# Patient Record
Sex: FEMALE | Race: WHITE | NOT HISPANIC OR LATINO | Employment: OTHER | URBAN - NONMETROPOLITAN AREA
[De-identification: names, ages, dates, MRNs, and addresses within clinical notes are randomized per-mention and may not be internally consistent; named-entity substitution may affect disease eponyms.]

---

## 2019-10-29 ENCOUNTER — CLINICAL SUPPORT (OUTPATIENT)
Dept: CARDIOLOGY | Facility: CLINIC | Age: 59
End: 2019-10-29

## 2019-10-29 ENCOUNTER — TELEPHONE (OUTPATIENT)
Dept: CARDIOLOGY | Facility: CLINIC | Age: 59
End: 2019-10-29

## 2019-10-29 ENCOUNTER — OFFICE VISIT (OUTPATIENT)
Dept: CARDIOLOGY | Facility: CLINIC | Age: 59
End: 2019-10-29

## 2019-10-29 VITALS
HEIGHT: 65 IN | SYSTOLIC BLOOD PRESSURE: 110 MMHG | DIASTOLIC BLOOD PRESSURE: 70 MMHG | HEART RATE: 70 BPM | BODY MASS INDEX: 23.32 KG/M2 | WEIGHT: 140 LBS

## 2019-10-29 DIAGNOSIS — E78.00 HYPERCHOLESTEREMIA: ICD-10-CM

## 2019-10-29 DIAGNOSIS — R00.2 PALPITATIONS: Primary | ICD-10-CM

## 2019-10-29 DIAGNOSIS — R01.1 MURMUR, CARDIAC: ICD-10-CM

## 2019-10-29 DIAGNOSIS — R00.2 PALPITATIONS: ICD-10-CM

## 2019-10-29 DIAGNOSIS — R42 DIZZINESS: ICD-10-CM

## 2019-10-29 DIAGNOSIS — F41.9 ANXIETY: ICD-10-CM

## 2019-10-29 PROCEDURE — 99203 OFFICE O/P NEW LOW 30 MIN: CPT | Performed by: INTERNAL MEDICINE

## 2019-10-29 PROCEDURE — 93000 ELECTROCARDIOGRAM COMPLETE: CPT | Performed by: INTERNAL MEDICINE

## 2019-10-29 PROCEDURE — 0296T PR EXT ECG > 48HR TO 21 DAY RCRD W/CONECT INTL RCRD: CPT | Performed by: INTERNAL MEDICINE

## 2019-10-29 RX ORDER — ALPRAZOLAM 0.5 MG/1
0.5 TABLET ORAL DAILY
COMMUNITY
End: 2021-08-17 | Stop reason: ALTCHOICE

## 2019-10-29 NOTE — TELEPHONE ENCOUNTER
10 day extended monitor placed today.  Patient asking when you want her to start the metoprolol?  She said she was unaware you were starting a medicine.

## 2019-10-29 NOTE — PROGRESS NOTES
Chief Complaint   Patient presents with   • Establish Care     palpitations, history of MVP   • Palpitations     started about 8 months ago, worse with anxiety. Occuring 2-3 times a week. No other symptoms associated. Usually occurs when laying down, lasts a few minutes.    • Cardiac history     diagnosed with MVP 29 years ago, saw cardiologist in [place] at the time, only an EKG since then.    • Aspirin     does not take a daily aspirin   • Labs     PCP recently checked, will request results.   • Anxiety     was previously on Prozac for several years, became ineffective. PCP trying to find one to replace it. Currently not taking anything, to start a new one in a week.         CARDIAC COMPLAINTS  Dizziness and palpitations      Subjective   Mariaa Abraham is a 59 y.o. female came in today for her initial cardiac evaluation.  She has history of mitral valve prolapse diagnosed many years ago when she was seen by a cardiologist at [place].  She was never put on any medications and has not had any symptoms from that.  She also has history of anxiety for which she has been on multiple different medications in the past.  She is now trying to wean her off most of the medication and just uses Xanax.  She also is taking Prozac and apparently the psychiatrist want to change into the Lexapro.  About 6 months ago she started noticing palpitation in the form of heart racing and skipping which occurs worse with anxiety but also at rest.  It normally lasts for few seconds to few minutes.  It is occasionally lasted for an hour.  During that time she feels dizzy, lightheaded.  She has not had any recent syncopal episode.  She drinks about a cup of coffee a day and very rarely she drinks any pop.  She did undergo some lab work recently and found to have normal electrolytes, normal B12 and folic acid level, normal hemoglobin, normal TSH.  Her cholesterol though was elevated at 308 with the LDL of 192 and HDL of 88.  She used to be  a smoker who quit many years ago.  Hypercholesterolemia runs in the family.  Her mother did have diabetes and did have heart disease.    History reviewed. No pertinent surgical history.    Current Outpatient Medications   Medication Sig Dispense Refill   • ALPRAZolam (XANAX) 0.5 MG tablet Take 0.5 mg by mouth Daily.     • metoprolol tartrate (LOPRESSOR) 25 MG tablet Take 1 tablet by mouth 2 (Two) Times a Day. 60 tablet 11     No current facility-administered medications for this visit.            ALLERGIES:  Bactrim [sulfamethoxazole-trimethoprim] and Sulfa antibiotics    Past Medical History:   Diagnosis Date   • Heart murmur    • History of bilateral breast reduction surgery    • History of surgery on right wrist    • Hyperlipidemia    • Mitral valve prolapse        Social History     Tobacco Use   Smoking Status Former Smoker   • Years: 5.00   • Types: Cigarettes   • Last attempt to quit:    • Years since quittin.8   Smokeless Tobacco Never Used          Family History   Problem Relation Age of Onset   • Heart attack Mother          at 69 with MI   • Heart disease Mother         CABG at 62, Valve replaced at 69 after her MI   • Hypertension Mother    • Hyperlipidemia Mother    • Diabetes Mother    • Alzheimer's disease Father    • Hyperlipidemia Brother    • Heart attack Maternal Grandfather          with MI at 62       Review of Systems   Constitution: Positive for malaise/fatigue. Negative for decreased appetite.   HENT: Negative for congestion and sore throat.    Eyes: Negative for blurred vision.   Cardiovascular: Positive for dyspnea on exertion and palpitations. Negative for chest pain.   Respiratory: Positive for shortness of breath. Negative for snoring.    Endocrine: Negative for cold intolerance and heat intolerance.   Hematologic/Lymphatic: Negative for adenopathy. Does not bruise/bleed easily.   Skin: Negative for itching, nail changes and skin cancer.   Musculoskeletal: Negative for  "arthritis and myalgias.   Gastrointestinal: Negative for abdominal pain, dysphagia and heartburn.   Genitourinary: Negative for bladder incontinence and frequency.   Neurological: Positive for dizziness. Negative for light-headedness, seizures and vertigo.   Psychiatric/Behavioral: Negative for altered mental status. The patient is nervous/anxious.    Allergic/Immunologic: Negative for environmental allergies and hives.       Diabetes- No  Thyroid- normal    Objective     /70 (BP Location: Left arm)   Pulse 70   Ht 165.1 cm (65\")   Wt 63.5 kg (140 lb)   BMI 23.30 kg/m²     Physical Exam   Constitutional: She is oriented to person, place, and time. She appears well-developed and well-nourished.   HENT:   Head: Normocephalic.   Nose: Nose normal.   Eyes: EOM are normal. Pupils are equal, round, and reactive to light.   Neck: Normal range of motion. Neck supple.   Cardiovascular: Normal rate, regular rhythm, S1 normal and S2 normal.   Murmur heard.  Pulmonary/Chest: Effort normal and breath sounds normal.   Abdominal: Soft. Bowel sounds are normal.   Musculoskeletal: Normal range of motion. She exhibits no edema.   Neurological: She is alert and oriented to person, place, and time.   Skin: Skin is warm and dry.   Psychiatric: She has a normal mood and affect.         ECG 12 Lead  Date/Time: 10/29/2019 3:37 PM  Performed by: Melissa Ricci MD  Authorized by: Melissa Ricci MD   Previous ECG: no previous ECG available  Rhythm: sinus rhythm  Rate: normal  QRS axis: normal  Other findings: non-specific ST-T wave changes    Clinical impression: non-specific ECG              Assessment/Plan   Patient's Body mass index is 23.3 kg/m². BMI is within normal parameters. No follow-up required..     Mariaa was seen today for establish care, palpitations, cardiac history, aspirin, labs and anxiety.    Diagnoses and all orders for this visit:    Palpitations  -     Adult Transthoracic Echo Complete W/ Cont if " Necessary Per Protocol; Future  -     Holter Monitor - 72 Hour Up To 21 Days; Future  -     metoprolol tartrate (LOPRESSOR) 25 MG tablet; Take 1 tablet by mouth 2 (Two) Times a Day.    Anxiety  -     metoprolol tartrate (LOPRESSOR) 25 MG tablet; Take 1 tablet by mouth 2 (Two) Times a Day.    Dizziness    Hypercholesteremia    Murmur, cardiac  -     Adult Transthoracic Echo Complete W/ Cont if Necessary Per Protocol; Future    At baseline, her heart rate is stable and so is her blood pressure.  EKG shows normal sinus rhythm, normal NV interval and normal QTC with nonspecific ST-T changes.  Clinical examination reveals her BMI is 23.  Cardiovascular examination systolic murmur.  At this time am not able to appreciate any click.  He does have normal peripheral pulse.    Regarding the palpitation, it appears to be more of anxiety related and it could be sinus tachycardia itself.  Other possibility could be PSVT.  I started her on a low-dose of beta-blockers in the form of Lopressor 25 mg twice daily might help with the anxiety also along with the palpitation.  I also placed a Holter monitor to evaluate the kind of arrhythmia she has.  If she has multiple runs of PSVT or PAC's then became change to class Ic antiarrhythmic medication if beta-blockers is not.  Also advised her to get an echocardiogram to evaluate the LV function, valvular structures as well as the PA pressure..    Regarding the dizziness,, it could be related to tachycardia itself.  I advised her to increase her fluid intake and avoid any caffeine intake.    Regarding the hypercholesterolemia, it is quite significantly elevated.  I talked to her about cutting down on the fat intake especially animal fat.  Recheck the lipids in about 6 months and if it is still significantly elevated, she may need to be on a statin.    The mitral valve prolapse, at this time not able to hear any click so we will get the echocardiogram to evaluate the valvular  structure.    Based on the results of all these tests, further recommendations will be made.               Electronically signed by Melissa Ricci MD October 29, 2019 12:36 PM

## 2019-10-30 NOTE — TELEPHONE ENCOUNTER
Patient aware of recommendations to start the metoprolol in 2 days of wearing the extended monitor to see the effect of the metoprolol.

## 2019-11-04 ENCOUNTER — HOSPITAL ENCOUNTER (OUTPATIENT)
Dept: CARDIOLOGY | Facility: HOSPITAL | Age: 59
Discharge: HOME | End: 2019-11-04
Admitting: INTERNAL MEDICINE

## 2019-11-04 VITALS — BODY MASS INDEX: 23.32 KG/M2 | HEIGHT: 65 IN | WEIGHT: 139.99 LBS

## 2019-11-04 DIAGNOSIS — R01.1 MURMUR, CARDIAC: ICD-10-CM

## 2019-11-04 DIAGNOSIS — R00.2 PALPITATIONS: ICD-10-CM

## 2019-11-04 LAB
BH CV ECHO MEAS - ACS: 1.8 CM
BH CV ECHO MEAS - AO MAX PG (FULL): 0.17 MMHG
BH CV ECHO MEAS - AO MAX PG: 3 MMHG
BH CV ECHO MEAS - AO MEAN PG (FULL): 0 MMHG
BH CV ECHO MEAS - AO MEAN PG: 1 MMHG
BH CV ECHO MEAS - AO ROOT AREA (BSA CORRECTED): 1.8
BH CV ECHO MEAS - AO ROOT AREA: 7.1 CM^2
BH CV ECHO MEAS - AO ROOT DIAM: 3 CM
BH CV ECHO MEAS - AO V2 MAX: 86.1 CM/SEC
BH CV ECHO MEAS - AO V2 MEAN: 54.5 CM/SEC
BH CV ECHO MEAS - AO V2 VTI: 16.1 CM
BH CV ECHO MEAS - BSA(HAYCOCK): 1.7 M^2
BH CV ECHO MEAS - BSA: 1.7 M^2
BH CV ECHO MEAS - BZI_BMI: 23.3 KILOGRAMS/M^2
BH CV ECHO MEAS - BZI_METRIC_HEIGHT: 165.1 CM
BH CV ECHO MEAS - BZI_METRIC_WEIGHT: 63.5 KG
BH CV ECHO MEAS - EDV(CUBED): 91.1 ML
BH CV ECHO MEAS - EDV(TEICH): 92.4 ML
BH CV ECHO MEAS - EF(CUBED): 75.1 %
BH CV ECHO MEAS - EF(TEICH): 67.2 %
BH CV ECHO MEAS - ESV(CUBED): 22.7 ML
BH CV ECHO MEAS - ESV(TEICH): 30.3 ML
BH CV ECHO MEAS - FS: 37.1 %
BH CV ECHO MEAS - IVS/LVPW: 0.83
BH CV ECHO MEAS - IVSD: 0.78 CM
BH CV ECHO MEAS - LA DIMENSION(2D): 3.3 CM
BH CV ECHO MEAS - LA DIMENSION: 3.4 CM
BH CV ECHO MEAS - LA/AO: 1.1
BH CV ECHO MEAS - LV IVRT: 0.1 SEC
BH CV ECHO MEAS - LV MASS(C)D: 126.2 GRAMS
BH CV ECHO MEAS - LV MASS(C)DI: 74.2 GRAMS/M^2
BH CV ECHO MEAS - LV MAX PG: 2.8 MMHG
BH CV ECHO MEAS - LV MEAN PG: 1 MMHG
BH CV ECHO MEAS - LV V1 MAX: 83.6 CM/SEC
BH CV ECHO MEAS - LV V1 MEAN: 49.1 CM/SEC
BH CV ECHO MEAS - LV V1 VTI: 19.2 CM
BH CV ECHO MEAS - LVIDD: 4.5 CM
BH CV ECHO MEAS - LVIDS: 2.8 CM
BH CV ECHO MEAS - LVPWD: 0.95 CM
BH CV ECHO MEAS - MV A MAX VEL: 49.7 CM/SEC
BH CV ECHO MEAS - MV DEC TIME: 0.24 SEC
BH CV ECHO MEAS - MV E MAX VEL: 80.8 CM/SEC
BH CV ECHO MEAS - MV E/A: 1.6
BH CV ECHO MEAS - MV MAX PG: 2.3 MMHG
BH CV ECHO MEAS - MV MEAN PG: 1 MMHG
BH CV ECHO MEAS - MV V2 MAX: 76 CM/SEC
BH CV ECHO MEAS - MV V2 MEAN: 42.7 CM/SEC
BH CV ECHO MEAS - MV V2 VTI: 22.9 CM
BH CV ECHO MEAS - PA MAX PG (FULL): 0.52 MMHG
BH CV ECHO MEAS - PA MAX PG: 2.6 MMHG
BH CV ECHO MEAS - PA MEAN PG (FULL): 1 MMHG
BH CV ECHO MEAS - PA MEAN PG: 2 MMHG
BH CV ECHO MEAS - PA V2 MAX: 80.3 CM/SEC
BH CV ECHO MEAS - PA V2 MEAN: 57.6 CM/SEC
BH CV ECHO MEAS - PA V2 VTI: 16.5 CM
BH CV ECHO MEAS - RV MAX PG: 2.1 MMHG
BH CV ECHO MEAS - RV MEAN PG: 1 MMHG
BH CV ECHO MEAS - RV V1 MAX: 71.8 CM/SEC
BH CV ECHO MEAS - RV V1 MEAN: 45.5 CM/SEC
BH CV ECHO MEAS - RV V1 VTI: 15.2 CM
BH CV ECHO MEAS - RVDD: 2.8 CM
BH CV ECHO MEAS - SI(AO): 66.9 ML/M^2
BH CV ECHO MEAS - SI(CUBED): 40.3 ML/M^2
BH CV ECHO MEAS - SI(TEICH): 36.5 ML/M^2
BH CV ECHO MEAS - SV(AO): 113.8 ML
BH CV ECHO MEAS - SV(CUBED): 68.5 ML
BH CV ECHO MEAS - SV(TEICH): 62.1 ML
MAXIMAL PREDICTED HEART RATE: 163 BPM
STRESS TARGET HR: 139 BPM

## 2019-11-04 PROCEDURE — 93306 TTE W/DOPPLER COMPLETE: CPT

## 2019-11-04 PROCEDURE — 93306 TTE W/DOPPLER COMPLETE: CPT | Performed by: INTERNAL MEDICINE

## 2019-11-21 ENCOUNTER — OUTSIDE FACILITY SERVICE (OUTPATIENT)
Dept: CARDIOLOGY | Facility: CLINIC | Age: 59
End: 2019-11-21

## 2019-11-21 DIAGNOSIS — R00.2 PALPITATIONS: ICD-10-CM

## 2019-11-21 PROCEDURE — 0298T PR EXT ECG > 48HR TO 21 DAY REVIEW AND INTERPRETATN: CPT | Performed by: INTERNAL MEDICINE

## 2019-12-03 ENCOUNTER — TELEPHONE (OUTPATIENT)
Dept: CARDIOLOGY | Facility: CLINIC | Age: 59
End: 2019-12-03

## 2019-12-03 RX ORDER — PROPRANOLOL HYDROCHLORIDE 10 MG/1
10 TABLET ORAL DAILY PRN
Qty: 30 TABLET | Refills: 11 | Status: SHIPPED | OUTPATIENT
Start: 2019-12-03 | End: 2021-11-22

## 2021-08-17 ENCOUNTER — DISEASE STATE MANAGEMENT VISIT (OUTPATIENT)
Dept: PHARMACY | Facility: HOSPITAL | Age: 61
End: 2021-08-17

## 2021-08-17 DIAGNOSIS — F33.2 SEVERE RECURRENT MAJOR DEPRESSION WITHOUT PSYCHOTIC FEATURES (HCC): Primary | ICD-10-CM

## 2021-08-17 RX ORDER — CLONIDINE HYDROCHLORIDE 0.1 MG/1
0.1 TABLET ORAL NIGHTLY PRN
COMMUNITY
End: 2023-03-29 | Stop reason: SDUPTHER

## 2021-08-17 RX ORDER — VENLAFAXINE HYDROCHLORIDE 37.5 MG/1
37.5 CAPSULE, EXTENDED RELEASE ORAL 2 TIMES DAILY WITH MEALS
COMMUNITY
End: 2021-10-29

## 2021-08-17 RX ORDER — MULTIPLE VITAMINS W/ MINERALS TAB 9MG-400MCG
1 TAB ORAL DAILY
COMMUNITY

## 2021-08-17 RX ORDER — MELATONIN
1000 DAILY
COMMUNITY

## 2021-08-17 RX ORDER — TRAZODONE HYDROCHLORIDE 50 MG/1
50 TABLET ORAL NIGHTLY PRN
COMMUNITY
End: 2023-03-29 | Stop reason: SDUPTHER

## 2021-08-17 RX ORDER — ROSUVASTATIN CALCIUM 10 MG/1
10 TABLET, COATED ORAL DAILY
COMMUNITY
End: 2022-11-09

## 2021-08-17 RX ORDER — LORAZEPAM 0.5 MG/1
0.5 TABLET ORAL 2 TIMES DAILY PRN
COMMUNITY

## 2021-08-17 NOTE — PROGRESS NOTES
Pharmacy Note    Mariaa Abraham is a 61 y.o. female [facility] for treatment resistant depression.       Medication: nasal esketamine (Spravato)    Dose: 56 mg   Provider: Rafael Chew        Past Medical History:   Diagnosis Date   • Heart murmur    • History of bilateral breast reduction surgery    • History of surgery on right wrist    • Hyperlipidemia    • Mitral valve prolapse      Social History     Socioeconomic History   • Marital status:      Spouse name: Not on file   • Number of children: Not on file   • Years of education: Not on file   • Highest education level: Not on file   Tobacco Use   • Smoking status: Former Smoker     Years: 5.00     Types: Cigarettes     Quit date:      Years since quittin.6   • Smokeless tobacco: Never Used   Substance and Sexual Activity   • Alcohol use: Yes     Alcohol/week: 7.0 standard drinks     Types: 7 Glasses of wine per week   • Drug use: No     Allergies   Allergen Reactions   • Bactrim [Sulfamethoxazole-Trimethoprim] Rash   • Sulfa Antibiotics Rash     Current Outpatient Medications   Medication Sig Dispense Refill   • ALPRAZolam (XANAX) 0.5 MG tablet Take 0.5 mg by mouth Daily.     • propranolol (INDERAL) 10 MG tablet Take 1 tablet by mouth Daily As Needed (for palpitations). 30 tablet 11     No current facility-administered medications for this visit.       Patient History    Aneurysmal vascular disease (including thoracic and abdominal aorta, intracranial, and peripheral arterial vessels)? No    Arteriovenous malformation No    History of intracerebral hemorrhage No    Significant cardiovascular history? No    Is the patient currently taking any of the following concomitant medication(s) that may cause sedation or blood pressure changes?   Spravato hold meds: Benzodiazepines      Does the Pt have severe hepatic impairment (Child-Cervantes class C)? No    Pregnant, breastfeeding, or planning to become pregnant? No  If  childbearing potential, on effective contraception? No    Antidepressant Treatment Failure Dates   Prozac     Paxil    Zoloft    Lamotrigine    Latuda    Fluvoxamine      SPRAVATO® REMS Patient Confirmation Number: pending    PHQ-9 Dates   Baseline: 21/27 8/17/2021                                       Drug Interactions Screening    A drug-drug interactions check was made. Patient has QTc prolongating agents (effexor and trazodone) on board patient's EKG in 2019 was WNL, will continue to monitor to see if patient becomes symptomatic. Also has medication that can cause CNS depression (lorazepam, trazodone, and Spravato) - patient will hold lorazepam per Dr. Chew and will be monitored for excessive sedation.     Assessment & Plan    Patient diagnosed with treatment resistant depression and has tried multiple antidepressants.  Will order Spravato™ 56mg intranasal once for administration in the [facility].  Pt will be monitored for a minimum of 2 hours according to protocol. Medication Guide will be provided at first visit.     Referring provider did indicate that the patient should hold any medication the morning prior to administration. (Lorazepam)     Will begin prior authorization process and provide medication counseling to patient once drug is approved and ready to dispense.      Will follow-up with patient prior to each administration.     aMcy Rodriguez, [place]  08/17/21  15:17 EDT             SPRAVATO® Patient Education   The patient was counseled on the following:   - This medication will only be administered in the facility and you must remain in the facility for monitoring for a minimum of 2 hours.  - To minimize chance of nausea, do not eat anything 2 hours before your appointment and do not drink anything 30 minutes before taking.   - If you take a nasal corticosteroid or nasal decongestant, take at least 1 hour prior to appointment  - If instructed by your referring provider, hold  medication(s) that may increase your BP or increase risk of sedation the morning prior to your appointment.    - This medication may cause you to feel “drunk”, sluggish, fatigue, or dizziness.  After administration, you cannot drive or operate heavy machinery until the next day after a restful sleep.  You should not do anything that you need to be completely alert for. You must have a pre-arranged  to take you home from each visit.    - May experience nausea/vomiting.   - Let your doctor know right away if you have any worsening of depression, signs of suicide, emotional instability or confusion.   - Call 911 or go to the nearest ED with any signs or symptoms of allergic reaction.   - Spravato can cause a temporary increase in your blood pressure that may last for about 4 hours after taking a dose. Your healthcare provider will check your blood pressure before taking SPRAVATO® and for at least 2 hours after you take SPRAVATO®. Tell your healthcare provider right away if you get chest pain, shortness of breath, sudden severe headache, change in vision, or seizures after taking SPRAVATO®.  - Tell your healthcare provider if you have problems thinking or remembering.  - Tell your healthcare provider if you develop trouble urinating, such as a frequent or urgent need to urinate, pain when urinating, or urinating frequently at night.  - The most common side effects of SPRAVATO® when used along with an antidepressant taken by mouth include: dissociation, dizziness, nausea, sedation, spinning sensation, reduced sense of touch and sensation, anxiety, lack of energy, increased blood pressure, vomiting, and feeling drunk.  - SPRAVATO® may cause sleepiness (sedation), fainting, dizziness, spinning sensation, anxiety, or feeling disconnected from yourself, your thoughts, feelings, space, and time (dissociation).  - Tell your healthcare provider right away if you feel like you cannot stay awake or if you feel like you are  going to pass out.  - Your healthcare provider must monitor you for serious side effects for at least 2 hours after taking SPRAVATO®. Your healthcare provider will decide when you are ready to leave the healthcare setting.  - There is a risk for abuse and physical and psychological dependence with SPRAVATO® treatment. Your healthcare provider should check you for signs of abuse and dependence before and during treatment with SPRAVATO®.  - Tell your healthcare provider if you have ever abused or been dependent on alcohol, prescription medicines, or street drugs.  Tell your healthcare provider right away if you have any of the following symptoms, especially if they are new, worse, or worry you: attempts to commit suicide, thoughts about suicide or dying, worsening depression, or other unusual changes in behavior or mood.

## 2021-08-24 ENCOUNTER — OFFICE VISIT (OUTPATIENT)
Dept: PSYCHIATRY | Facility: CLINIC | Age: 61
End: 2021-08-24

## 2021-08-24 ENCOUNTER — HOSPITAL ENCOUNTER (OUTPATIENT)
Dept: INFUSION THERAPY | Facility: HOSPITAL | Age: 61
Setting detail: INFUSION SERIES
Discharge: HOME | End: 2021-08-24

## 2021-08-24 ENCOUNTER — HOSPITAL ENCOUNTER (OUTPATIENT)
Dept: RESPIRATORY THERAPY | Facility: HOSPITAL | Age: 61
Discharge: HOME | End: 2021-08-24
Admitting: PSYCHIATRY & NEUROLOGY

## 2021-08-24 VITALS
HEART RATE: 71 BPM | DIASTOLIC BLOOD PRESSURE: 74 MMHG | RESPIRATION RATE: 17 BRPM | TEMPERATURE: 97.7 F | SYSTOLIC BLOOD PRESSURE: 124 MMHG

## 2021-08-24 DIAGNOSIS — R00.2 PALPITATIONS: ICD-10-CM

## 2021-08-24 DIAGNOSIS — R00.2 PALPITATIONS: Primary | ICD-10-CM

## 2021-08-24 DIAGNOSIS — F32.A DEPRESSION, UNSPECIFIED DEPRESSION TYPE: Primary | ICD-10-CM

## 2021-08-24 LAB
QT INTERVAL: 390 MS
QTC INTERVAL: 458 MS

## 2021-08-24 PROCEDURE — 93010 ELECTROCARDIOGRAM REPORT: CPT | Performed by: INTERNAL MEDICINE

## 2021-08-24 PROCEDURE — 25010000002 ESKETAMINE HCL (56 MG DOSE) 28 MG/DEVICE SOLUTION THERAPY: Performed by: PSYCHIATRY & NEUROLOGY

## 2021-08-24 PROCEDURE — 93005 ELECTROCARDIOGRAM TRACING: CPT | Performed by: PSYCHIATRY & NEUROLOGY

## 2021-08-24 PROCEDURE — S0013 ESKETAMINE, NASAL SPRAY: HCPCS | Performed by: PSYCHIATRY & NEUROLOGY

## 2021-08-24 PROCEDURE — 90792 PSYCH DIAG EVAL W/MED SRVCS: CPT | Performed by: PSYCHIATRY & NEUROLOGY

## 2021-08-24 PROCEDURE — G0463 HOSPITAL OUTPT CLINIC VISIT: HCPCS

## 2021-08-24 RX ADMIN — ESKETAMINE HYDROCHLORIDE 56 MG: 28 SOLUTION NASAL at 13:14

## 2021-08-24 NOTE — PATIENT INSTRUCTIONS
Esketamine nasal spray  What is this medicine?  Esketamine (Es EARL ta meen) is an antidepressant used in combination with other antidepressants for treating depression that has not responded to other therapies. This medicine is also used with other antidepressants in patients who have depression along with suicidal thoughts or actions. Esketamine is only given in a healthcare setting, such as a clinic or hospital.  This medicine may be used for other purposes; ask your health care provider or pharmacist if you have questions.  COMMON BRAND NAME(S): Spravato  What should I tell my health care provider before I take this medicine?  They need to know if you have any of these conditions:  · bleeding in the brain  · blood vessel disease (including in the brain, chest, arms, legs, or other areas of the body)  · dementia  · heart disease  · high blood pressure  · history of drug abuse or alcohol abuse problem  · history of head injury  · history of heart attack  · history of irregular heartbeat  · history of stroke  · liver disease  · schizophrenia or other thought disorder  · suicidal thoughts, plans or attempt; a previous suicide by you or a family member  · an unusual or allergic reaction to esketamine, ketamine, other medicines, foods, dyes, or preservatives  · pregnant or trying to get pregnant  · breast-feeding  How should I use this medicine?  This medicine is for use in the nose. You will take it yourself under the supervision of a healthcare provider in a healthcare setting. Your provider will show you how to use the nasal spray device, how much to take, and when to take it. During and after each treatment, you will be checked by a healthcare provider who will decide when you can go home. You will need to plan for a caregiver or family member to drive you home each time after taking the medicine. Do not drive or perform other tasks that require complete mental alertness until the day following your treatment after  a restful sleep. Because this medicine can cause nausea and vomiting, you should not eat for at least 2 hours before taking this medicine or drink liquids for at least 30 minutes before taking this medicine. If you take a nasal corticosteroid or nasal decongestant, take these medicines at least 1 hour before your treatment. Do not stop your treatment except on your doctor's advice.  A special MedGuide will be given to you by the pharmacist with each prescription. Be sure to read this information carefully each time.  Talk to your pediatrician regarding the use of this medicine in children. This medicine is not approved for use in children.  Overdosage: If you think you have taken too much of this medicine contact a poison control center or emergency room at once.  NOTE: This medicine is only for you. Do not share this medicine with others.  What if I miss a dose?  Keep appointments for follow-up doses. It is important not to miss your dose. Call your doctor or healthcare professional if you are unable to keep an appointment.  What may interact with this medicine?  This medicine may interact with the following medications:  · alcohol  · antihistamines for allergy, cough, and cold  · certain medicines for anxiety or sleep  · certain medicines for depression like amitriptyline, fluoxetine, sertraline  · certain medicines for seizures like phenobarbital, primidone  · general anesthetics like halothane, isoflurane, methoxyflurane, propofol  · ketamine  · local anesthetics like lidocaine, pramoxine, tetracaine  · MAOIs like Carbex, Eldepryl, Marplan, Nardil, and Parnate  · medicines that relax muscles for surgery  · narcotic medicines for pain  · phenothiazines like chlorpromazine, mesoridazine, prochlorperazine, thioridazine  · psychostimulants like amphetamines, methylphenidate, dexmethylphenidate, modafinil, or armodafinil  This list may not describe all possible interactions. Give your health care provider a list of  all the medicines, herbs, non-prescription drugs, or dietary supplements you use. Also tell them if you smoke, drink alcohol, or use illegal drugs. Some items may interact with your medicine.  What should I watch for while using this medicine?  Your condition will be monitored carefully while you are receiving this medicine. Tell your healthcare professional if your symptoms do not start to get better or if they get worse.  You may get drowsy or dizzy. Do not drive, use machinery, or do anything that needs mental alertness until the day following your treatment after a restful sleep. Do not stand up or sit up quickly, especially if you are an older patient. This reduces the risk of dizzy or fainting spells. Avoid alcoholic drinks; they can make you dizzier.  There is a risk for abuse and dependence when using this medicine. Your healthcare provider should check you for signs of abuse and dependence before and during treatment with this medicine. Your healthcare provider can tell you more about the difference between dependence and drug addiction.  Your healthcare provider must monitor you for serious side effects for at least 2 hours after each treatment with this medicine. Your healthcare provider will decide when you are ready to leave the healthcare setting and return home. Tell your healthcare provider right away if you feel like you cannot stay awake or if you feel like you are going to pass out.  Women should inform their doctor if they wish to become pregnant or think they might be pregnant. There is a potential for serious side effects to an unborn child. Women should not breast-feed their infant while taking this medicine. This medicine passes into breast milk and may cause harm to a nursing infant.  What side effects may I notice from receiving this medicine?  Side effects that you should report to your doctor or health care professional as soon as possible:  · allergic reactions like skin rash, itching or  hives; swelling of the face, lips, or tongue  · altered sense of time and space  · confusion  · disconnected from yourself, your thoughts, or your feelings  · fast, irregular heartbeat  · hallucinations  · loss of balance or coordination  · loss of consciousness  · loss of memory  · reduced sense of touch or sensation  · signs and symptoms of bladder or urinary tract problems like frequent or urgent urination; frequently urinating at night; painful urinating; blood in urine  · signs and symptoms of a dangerous increase in blood pressure like chest pain; shortness of breath; sudden severe headache; vision disturbances; seizures; decreased consciousness  · signs and symptoms of a stroke like changes in vision; confusion; trouble speaking or understanding; severe headaches; sudden numbness or weakness of the face, arm or leg; trouble walking; dizziness; loss of balance or coordination  · suicidal thoughts, mood changes  Side effects that usually do not require medical attention (report these to your doctor or health care professional if they continue or are bothersome):  · dizziness  · drowsiness  · dry mouth  · feeling drunk  · feeling very happy or excited  · lack of energy  · nausea  · spinning sensation  · vomiting  This list may not describe all possible side effects. Call your doctor for medical advice about side effects. You may report side effects to FDA at 6-044-MHF-7503.  Where should I keep my medicine?  This medicine is given in a hospital or clinic and will not be stored at home.  NOTE: This sheet is a summary. It may not cover all possible information. If you have questions about this medicine, talk to your doctor, pharmacist, or health care provider.  © 2021 [employer] (2021-01-07 14:42:12)

## 2021-08-24 NOTE — PROGRESS NOTES
"      INITIAL PSYCHIATRIC HISTORY & PHYSICAL    Patient Identification:  Name:  Mariaa Abraham  Age:  61 y.o.  Sex:  female  :  1960  MRN:  [id number]   Visit Number:  [id number]  Primary Care Physician:  Anitha Martin APRN    SUBJECTIVE    CC/Focus of Exam: depression, Spravato evaluatiion    HPI: Mariaa Abraham \"Lyla\" is a 61 y.o. female who presents today for evaluation to consider Spravato for treatment-resistant depression.     Lyla has struggled with depression for many years. She was called a \"worry wart\" as a child. At 30y, she had her first major depressive episode for which she sought counseling and started medication. She was always diagnosed with OCD & MAHAMED at that time. She was started on fluoxetine, which helped significantly and provided some stability for roughly 25 years. Roughly two years ago, Lyla had another depressive episode. She started care at [facility] & was taken off fluoxetine, started fluvoxamine. Following a two-week med holiday, she started escitalopram but anxiety worsened. Thereafter, she was prescribed sertraline, Latuda, lamotrigine & paroxetine. Symptoms of depression & anxiety worsened to the degree that patient was been unable to get out of bed, with worsening mood, hopelessness, worthlessness, overwhelming guilt, emotional lability, significant tearfulness, worsening anxiety, insomnia with delayed onset & poor maintenance. Symptoms are severe, persistent, present in multiple settings, worse in the last month, uncertain what makes symptoms worse or better.    Roughly 6 months ago, Lyla was prescribed Effexor XR 37.5 mg daily & low-dose lorazepam.  She has had to stay on 37.5 mg daily dose due to side effects or worsening anxiety when Effexor was increased.  Similar response to fluoxetine the last time she was on that medication.    Patient spends her time during the week taking care of 3.5y twin granddaughters, spending time around the house, trying to get out " on the boat.     SPRAVATO® REMS Patient Confirmation Number: pending     PHQ-9 Dates   Baseline: 2021                                                    PAST MEDICAL HX:  Mitral valve prolapse    PAST PSYCHIATRIC HX:  Dx: MDD, MAHAMED, OCD  IP: denied  OP: denied; last provider in 2019  Current meds: Effexor XR 37.5mg qAM, lorazepam 0.5mg, clonidine 0.05mg daily PRN  Previous meds:     Antidepressant Treatment Failure Dates   Prozac      Paxil     Zoloft     Lamotrigine     Latuda     Fluvoxamine        SH/SI/SA: denied/intermittent/denied  Trauma: denied    SUBSTANCE USE HX:  Reports social drinking, usually 1-2 glasses of wine every night  Denies smoking or illicit drug use    SOCIAL HX:   for 30y, two daughters, two twin granddaughters  Completed some college  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 AVG 76. . Two runs of SVT. Longest- 9 beats.   • ECHO - CONVERTED  2019    EF 65%. MVP. Mild MR.       Family History   Problem Relation Age of Onset   • Heart attack Mother          at 69 with MI   • Heart disease Mother         CABG at 62, Valve replaced at 69 after her MI   • Hypertension Mother    • Hyperlipidemia Mother    • Diabetes Mother    • Alzheimer's disease Father    • Hyperlipidemia Brother    • Heart attack Maternal Grandfather          with MI at 62       ALLERGIES:  Bactrim [sulfamethoxazole-trimethoprim] and Sulfa antibiotics    REVIEW OF SYSTEMS:  Review of Systems   Psychiatric/Behavioral: Positive for dysphoric mood and sleep disturbance. The patient is nervous/anxious.     All other systems reviewed and are negative.       OBJECTIVE    PHYSICAL EXAM:  Physical Exam  Vitals and nursing note reviewed.   Constitutional:       Appearance: She is well-developed.   HENT:      Head: Normocephalic and atraumatic.      Right Ear: External ear normal.      Left Ear: External ear normal.      Nose: Nose normal.   Eyes:      Pupils: Pupils are equal, round, and reactive to light.   Pulmonary:      Effort: Pulmonary effort is normal. No respiratory distress.      Breath sounds: Normal breath sounds.   Abdominal:      General: There is no distension.      Palpations: Abdomen is soft.   Musculoskeletal:         General: No deformity. Normal range of motion.      Cervical back: Normal range of motion and neck supple.   Skin:     General: Skin is warm.      Findings: No rash.   Neurological:      Mental Status: She is alert and oriented to person, place, and time.      Coordination: Coordination normal.       MENTAL STATUS EXAM:   Hygiene:   good  Cooperation:  Cooperative  Eye Contact:  Downcast  Psychomotor Behavior:  Appropriate  Affect:  Restricted  Hopelessness: 6  Speech:  Normal  Thought Progress:  Goal directed and Linear  Thought Content:  Mood congruent  Suicidal:  None  Homicidal:  None  Hallucinations:  None  Delusion:  None  Memory:  Intact  Orientation:  Person, Place, Time and Situation  Reliability:  good  Insight:  Good  Judgement:  Good  Impulse Control:  Good      Hospital Outpatient Visit on 08/24/2021   Component Date Value Ref Range Status   • QT Interval 08/24/2021 390  ms Preliminary   • QTC Interval 08/24/2021 458  ms Preliminary       Chart, notes, vitals, labs and EKG personally reviewed.    ASSESSMENT & PLAN:    Major depressive disorder, severe, recurrent, without psychosis  -Continue Effexor XR 37.5 mg every morning  -Continue trazodone nightly  -Recommend outpatient care with psychiatrist and therapist    Evaluation for Spravato treatment  -Patient with severe symptoms  of depression despite multiple medication trials and therapy  -No history of hypertension, arteriovenous malformation, hypersensitivity to ketamine to her knowledge  -Distant history of mitral valve prolapse, with normal echo 2 years ago and normal EKG today  -PHQ 9 on 8/17/2021 elevated at 21/27  -Appropriate to begin Spravato treatment today with 56 mg dosage    Generalized anxiety disorder  -Continue medication as above  -Continue lorazepam 0.5 mg daily as needed.  Encouraged Lyla to try to avoid lorazepam immediately prior to ketamine treatment if possible    Obsessive-compulsive disorder  -Medication as above  -Encourage outpatient therapy    Mitral valve prolapse  -Diagnosed many years ago  -Reported to have had a normal echocardiogram 2 years ago  -EKG obtained today showed normal sinus rhythm, no acute abnormality    Return to clinic 4 weeks

## 2021-08-26 ENCOUNTER — HOSPITAL ENCOUNTER (OUTPATIENT)
Dept: INFUSION THERAPY | Facility: HOSPITAL | Age: 61
Setting detail: INFUSION SERIES
Discharge: HOME | End: 2021-08-26

## 2021-08-26 VITALS
RESPIRATION RATE: 18 BRPM | SYSTOLIC BLOOD PRESSURE: 137 MMHG | HEART RATE: 80 BPM | TEMPERATURE: 97.7 F | DIASTOLIC BLOOD PRESSURE: 74 MMHG

## 2021-08-26 DIAGNOSIS — F32.A DEPRESSION, UNSPECIFIED DEPRESSION TYPE: Primary | ICD-10-CM

## 2021-08-26 PROCEDURE — 25010000002 ESKETAMINE HCL (56 MG DOSE) 28 MG/DEVICE SOLUTION THERAPY: Performed by: PSYCHIATRY & NEUROLOGY

## 2021-08-26 PROCEDURE — G0463 HOSPITAL OUTPT CLINIC VISIT: HCPCS

## 2021-08-26 PROCEDURE — S0013 ESKETAMINE, NASAL SPRAY: HCPCS | Performed by: PSYCHIATRY & NEUROLOGY

## 2021-08-26 RX ADMIN — ESKETAMINE HYDROCHLORIDE 56 MG: 28 SOLUTION NASAL at 12:53

## 2021-08-27 ENCOUNTER — DISEASE STATE MANAGEMENT VISIT (OUTPATIENT)
Dept: PHARMACY | Facility: HOSPITAL | Age: 61
End: 2021-08-27

## 2021-08-27 DIAGNOSIS — F33.2 SEVERE RECURRENT MAJOR DEPRESSION WITHOUT PSYCHOTIC FEATURES (HCC): ICD-10-CM

## 2021-08-27 NOTE — PROGRESS NOTES
Pharmacy Note    Mariaa Abraham is a 61 y.o. female seen [facility] for treatment resistant depression.       Medication: nasal esketamine (Spravato)    Dose: 56 mg   Provider: Rafael Chew        Past Medical History:   Diagnosis Date   • Heart murmur    • History of bilateral breast reduction surgery    • History of surgery on right wrist    • Hyperlipidemia    • Mitral valve prolapse      Social History     Socioeconomic History   • Marital status:      Spouse name: Not on file   • Number of children: Not on file   • Years of education: Not on file   • Highest education level: Not on file   Tobacco Use   • Smoking status: Former Smoker     Years: 5.00     Types: Cigarettes     Quit date:      Years since quittin.6   • Smokeless tobacco: Never Used   Substance and Sexual Activity   • Alcohol use: Yes     Alcohol/week: 7.0 standard drinks     Types: 7 Glasses of wine per week   • Drug use: No     Allergies   Allergen Reactions   • Bactrim [Sulfamethoxazole-Trimethoprim] Rash   • Sulfa Antibiotics Rash     Current Outpatient Medications   Medication Sig Dispense Refill   • cholecalciferol (VITAMIN D3) 25 MCG (1000 UT) tablet Take 1,000 Units by mouth Daily.     • cloNIDine (CATAPRES) 0.1 MG tablet Take 0.1 mg by mouth At Night As Needed for High Blood Pressure. Take 1/2 to 1 tablet nightly as needed     • Esketamine HCl, 56 MG Dose, Nasal Solution Use 4 sprays into the nostril(s) as directed by provider 1 (One) Time for 1 dose. 2 each 1   • LORazepam (ATIVAN) 0.5 MG tablet Take 0.5 mg by mouth 2 (Two) Times a Day As Needed for Anxiety.     • multivitamin with minerals (MULTIVITAMIN ADULT PO) Take 1 tablet by mouth Daily.     • propranolol (INDERAL) 10 MG tablet Take 1 tablet by mouth Daily As Needed (for palpitations). 30 tablet 11   • rosuvastatin (CRESTOR) 10 MG tablet Take 10 mg by mouth Daily.     • traZODone (DESYREL) 50 MG tablet Take 50 mg by mouth At Night As  Needed for Sleep.     • venlafaxine XR (EFFEXOR-XR) 37.5 MG 24 hr capsule Take 37.5 mg by mouth 2 (Two) Times a Day With Meals.       No current facility-administered medications for this visit.       Patient History    Aneurysmal vascular disease (including thoracic and abdominal aorta, intracranial, and peripheral arterial vessels)? No    Arteriovenous malformation No    History of intracerebral hemorrhage No    Significant cardiovascular history? No    Is the patient currently taking any of the following concomitant medication(s) that may cause sedation or blood pressure changes?   Spravato hold meds: Benzodiazepines      Does the Pt have severe hepatic impairment (Child-Cervantes class C)? No    Pregnant, breastfeeding, or planning to become pregnant? No  If childbearing potential, on effective contraception? No    Antidepressant Treatment Failure Dates   Prozac     Paxil    Zoloft    Lamotrigine    Latuda    Fluvoxamine      SPRAVATO® REMS Patient Confirmation Number: pending    PHQ-9 Dates   Baseline: 21/27 8/17/2021 9/27 8/27/2021                                   Drug Interactions Screening    A drug-drug interactions check was made. Patient has QTc prolongating agents (effexor and trazodone) on board patient's EKG in 2019 was WNL, will continue to monitor to see if patient becomes symptomatic. Also has medication that can cause CNS depression (lorazepam, trazodone, and Spravato) - patient will hold lorazepam per Dr. Chew and will be monitored for excessive sedation.           Follow-Up Assessment & Plan    Patient diagnosed with treatment resistant depression and has tried multiple antidepressants.  Patient was assessed telephonically today. Pt had a very low mood after the initial treatment on 8/24 in which she became very overwhelmed. We spoke the following day and she reported that she was feeling better, but was still experiencing some increased anxiety. Patient was instructed to hold Ativan before  treatment and we would reassess at that time. Patient's treatment on 8/26 went much better. She did not experience overwhelming feelings of depression at any point. Patient reports that she felt really good yesterday and today, but did receive some upsetting news earlier today that she and her  agreed she handled better than she normally would. Patient denies any issues with the medication and would like to proceed with treatment. Patient denies questions or concerns at this time.    Based on patient's improved PHQ-9 score, will Continue dose and will order Spravato 56 mg intranasal twice weekly for administration in the [facility].  Patient will be monitored for a minimum of 2 hours according to protocol.     Referring provider did indicate that the patient should hold any medication the morning prior to administration. (Ativan as mentioned above)    Will follow-up with patient prior to each administration.     Macy Rodriguez, PharmD  08/27/21  13:54 EDT     Follow-Up Monitoring   Medication Changes No  PHQ-9 : 9/27  BP : WNL  Worsening of depression No  Emergence of suicidal thoughts and behaviors No  S/Sx of abuse/misuse  No

## 2021-08-31 ENCOUNTER — HOSPITAL ENCOUNTER (OUTPATIENT)
Dept: INFUSION THERAPY | Facility: HOSPITAL | Age: 61
Setting detail: INFUSION SERIES
Discharge: HOME | End: 2021-08-31

## 2021-08-31 VITALS
TEMPERATURE: 97.9 F | HEART RATE: 85 BPM | RESPIRATION RATE: 18 BRPM | DIASTOLIC BLOOD PRESSURE: 86 MMHG | SYSTOLIC BLOOD PRESSURE: 146 MMHG

## 2021-08-31 DIAGNOSIS — F32.A DEPRESSION, UNSPECIFIED DEPRESSION TYPE: Primary | ICD-10-CM

## 2021-08-31 PROCEDURE — 25010000002 ESKETAMINE HCL (56 MG DOSE) 28 MG/DEVICE SOLUTION THERAPY: Performed by: PSYCHIATRY & NEUROLOGY

## 2021-08-31 PROCEDURE — G0463 HOSPITAL OUTPT CLINIC VISIT: HCPCS

## 2021-08-31 PROCEDURE — S0013 ESKETAMINE, NASAL SPRAY: HCPCS | Performed by: PSYCHIATRY & NEUROLOGY

## 2021-08-31 RX ADMIN — ESKETAMINE HYDROCHLORIDE 56 MG: 28 SOLUTION NASAL at 11:24

## 2021-09-02 ENCOUNTER — HOSPITAL ENCOUNTER (OUTPATIENT)
Dept: INFUSION THERAPY | Facility: HOSPITAL | Age: 61
Setting detail: INFUSION SERIES
Discharge: HOME | End: 2021-09-02

## 2021-09-02 VITALS
HEART RATE: 83 BPM | RESPIRATION RATE: 18 BRPM | TEMPERATURE: 97.8 F | DIASTOLIC BLOOD PRESSURE: 84 MMHG | SYSTOLIC BLOOD PRESSURE: 133 MMHG

## 2021-09-02 DIAGNOSIS — F32.A DEPRESSION, UNSPECIFIED DEPRESSION TYPE: Primary | ICD-10-CM

## 2021-09-02 PROCEDURE — S0013 ESKETAMINE, NASAL SPRAY: HCPCS | Performed by: PSYCHIATRY & NEUROLOGY

## 2021-09-02 PROCEDURE — 25010000002 ESKETAMINE HCL (56 MG DOSE) 28 MG/DEVICE SOLUTION THERAPY: Performed by: PSYCHIATRY & NEUROLOGY

## 2021-09-02 PROCEDURE — G0463 HOSPITAL OUTPT CLINIC VISIT: HCPCS

## 2021-09-02 RX ADMIN — ESKETAMINE HYDROCHLORIDE 56 MG: 28 SOLUTION NASAL at 11:35

## 2021-09-03 ENCOUNTER — DISEASE STATE MANAGEMENT VISIT (OUTPATIENT)
Dept: PHARMACY | Facility: HOSPITAL | Age: 61
End: 2021-09-03

## 2021-09-03 DIAGNOSIS — F33.2 SEVERE RECURRENT MAJOR DEPRESSION WITHOUT PSYCHOTIC FEATURES (HCC): ICD-10-CM

## 2021-09-03 NOTE — PROGRESS NOTES
Pharmacy Note    Mariaa Abraham is a 61 y.o. female seen in the [facility] for treatment resistant depression.       Medication: nasal esketamine (Spravato)    Dose: 56 mg   Provider: Rafael Chew        Past Medical History:   Diagnosis Date   • Heart murmur    • History of bilateral breast reduction surgery    • History of surgery on right wrist    • Hyperlipidemia    • Mitral valve prolapse      Social History     Socioeconomic History   • Marital status:      Spouse name: Not on file   • Number of children: Not on file   • Years of education: Not on file   • Highest education level: Not on file   Tobacco Use   • Smoking status: Former Smoker     Years: 5.00     Types: Cigarettes     Quit date:      Years since quittin.6   • Smokeless tobacco: Never Used   Substance and Sexual Activity   • Alcohol use: Yes     Alcohol/week: 7.0 standard drinks     Types: 7 Glasses of wine per week   • Drug use: No     Allergies   Allergen Reactions   • Bactrim [Sulfamethoxazole-Trimethoprim] Rash   • Sulfa Antibiotics Rash     Current Outpatient Medications   Medication Sig Dispense Refill   • cholecalciferol (VITAMIN D3) 25 MCG (1000 UT) tablet Take 1,000 Units by mouth Daily.     • cloNIDine (CATAPRES) 0.1 MG tablet Take 0.1 mg by mouth At Night As Needed for High Blood Pressure. Take 1/2 to 1 tablet nightly as needed     • Esketamine HCl, 56 MG Dose, Nasal Solution Use 4 sprays into the nostril(s) as directed by provider 1 (One) Time for 1 dose. 2 each 1   • LORazepam (ATIVAN) 0.5 MG tablet Take 0.5 mg by mouth 2 (Two) Times a Day As Needed for Anxiety.     • multivitamin with minerals (MULTIVITAMIN ADULT PO) Take 1 tablet by mouth Daily.     • propranolol (INDERAL) 10 MG tablet Take 1 tablet by mouth Daily As Needed (for palpitations). 30 tablet 11   • rosuvastatin (CRESTOR) 10 MG tablet Take 10 mg by mouth Daily.     • traZODone (DESYREL) 50 MG tablet Take 50 mg by mouth At Night As  Needed for Sleep.     • venlafaxine XR (EFFEXOR-XR) 37.5 MG 24 hr capsule Take 37.5 mg by mouth 2 (Two) Times a Day With Meals.       No current facility-administered medications for this visit.       Patient History    Aneurysmal vascular disease (including thoracic and abdominal aorta, intracranial, and peripheral arterial vessels)? No    Arteriovenous malformation No    History of intracerebral hemorrhage No    Significant cardiovascular history? No    Is the patient currently taking any of the following concomitant medication(s) that may cause sedation or blood pressure changes?   Spravato hold meds: Benzodiazepines      Does the Pt have severe hepatic impairment (Child-Cervantes class C)? No    Pregnant, breastfeeding, or planning to become pregnant? No  If childbearing potential, on effective contraception? No    Antidepressant Treatment Failure Dates   Prozac     Paxil    Zoloft    Lamotrigine    Latuda    Fluvoxamine      SPRAVATO® REMS Patient Confirmation Number: pending    PHQ-9 Dates   Baseline: 21/27 8/17/2021   9/27 8/27/2021   10/27 9/3/2021                               Drug Interactions Screening    A drug-drug interactions check was made. Patient has QTc prolongating agents (effexor and trazodone) on board patient's EKG in 2019 was WNL, will continue to monitor to see if patient becomes symptomatic. Also has medication that can cause CNS depression (lorazepam, trazodone, and Spravato) - patient will hold lorazepam per Dr. Chew and will be monitored for excessive sedation.           Follow-Up Assessment & Plan    Patient diagnosed with treatment resistant depression and has tried multiple antidepressants.  Patient was assessed telephonically today. Pt reports that she feels better in terms of her depression, but is still struggling with anxiety. She reports that she has been holding the Ativan, but usually on the drives home from treatment she starts to have the feeling of bottoming out again, not to  "the point of tears, but still very intense. She reports that she is scared, but unable to determine a specific thing she is fearful of. She states that the feeling lasted about 30 minutes on Thursday, but a \"little longer\" than that on Tuesday. She does report that she is noticing improvement in her sleep since starting treatment as well. Patient is agreeable to continue treatment twice weekly at this time.    Based on patient's improved PHQ-9 score, will continue dose and will order Spravato 56 mg intranasal twice weekly for administration in the [facility].  Patient will be monitored for a minimum of 2 hours according to protocol.     Referring provider did indicate that the patient should hold any medication the morning prior to administration. (Ativan as mentioned above)    Will follow-up with patient prior to each administration.     Macy Rodriguez, PharmD  08/27/21  13:54 EDT     Follow-Up Monitoring   Medication Changes No  PHQ-9 : 10/27  BP : Patient did have a slight elevation in BP before Tuesday treatment (146/77), but was asymptomatic other than some feelings of anxiety. Dr. Chew was notified and recommended to proceed with treatment and notify him if SBP >160. Patient tolerated treatment and BP remained WNL throughout.  Worsening of depression No  Emergence of suicidal thoughts and behaviors No  S/Sx of abuse/misuse  No        "

## 2021-09-07 ENCOUNTER — HOSPITAL ENCOUNTER (OUTPATIENT)
Dept: INFUSION THERAPY | Facility: HOSPITAL | Age: 61
Setting detail: INFUSION SERIES
Discharge: HOME | End: 2021-09-07

## 2021-09-07 VITALS
DIASTOLIC BLOOD PRESSURE: 67 MMHG | SYSTOLIC BLOOD PRESSURE: 126 MMHG | RESPIRATION RATE: 18 BRPM | HEART RATE: 82 BPM | TEMPERATURE: 97.8 F

## 2021-09-07 DIAGNOSIS — F32.A DEPRESSION, UNSPECIFIED DEPRESSION TYPE: Primary | ICD-10-CM

## 2021-09-07 PROCEDURE — 25010000002 ESKETAMINE HCL (56 MG DOSE) 28 MG/DEVICE SOLUTION THERAPY: Performed by: PSYCHIATRY & NEUROLOGY

## 2021-09-07 PROCEDURE — S0013 ESKETAMINE, NASAL SPRAY: HCPCS | Performed by: PSYCHIATRY & NEUROLOGY

## 2021-09-07 PROCEDURE — G0463 HOSPITAL OUTPT CLINIC VISIT: HCPCS

## 2021-09-07 RX ADMIN — ESKETAMINE HYDROCHLORIDE 56 MG: 28 SOLUTION NASAL at 11:12

## 2021-09-09 ENCOUNTER — HOSPITAL ENCOUNTER (OUTPATIENT)
Dept: INFUSION THERAPY | Facility: HOSPITAL | Age: 61
Setting detail: INFUSION SERIES
Discharge: HOME | End: 2021-09-09

## 2021-09-09 VITALS
RESPIRATION RATE: 16 BRPM | DIASTOLIC BLOOD PRESSURE: 86 MMHG | SYSTOLIC BLOOD PRESSURE: 148 MMHG | TEMPERATURE: 97.9 F | HEART RATE: 78 BPM

## 2021-09-09 DIAGNOSIS — F32.A DEPRESSION, UNSPECIFIED DEPRESSION TYPE: Primary | ICD-10-CM

## 2021-09-09 PROCEDURE — G0463 HOSPITAL OUTPT CLINIC VISIT: HCPCS

## 2021-09-09 PROCEDURE — S0013 ESKETAMINE, NASAL SPRAY: HCPCS | Performed by: PSYCHIATRY & NEUROLOGY

## 2021-09-09 PROCEDURE — 25010000002 ESKETAMINE HCL (56 MG DOSE) 28 MG/DEVICE SOLUTION THERAPY: Performed by: PSYCHIATRY & NEUROLOGY

## 2021-09-09 RX ADMIN — ESKETAMINE HYDROCHLORIDE 56 MG: 28 SOLUTION NASAL at 11:14

## 2021-09-10 ENCOUNTER — DISEASE STATE MANAGEMENT VISIT (OUTPATIENT)
Dept: PHARMACY | Facility: HOSPITAL | Age: 61
End: 2021-09-10

## 2021-09-10 DIAGNOSIS — F33.2 SEVERE RECURRENT MAJOR DEPRESSION WITHOUT PSYCHOTIC FEATURES (HCC): ICD-10-CM

## 2021-09-10 NOTE — PROGRESS NOTES
Pharmacy Note    Mariaa Abraham is a 61 y.o. female seen in the [facility] for treatment resistant depression.       Medication: nasal esketamine (Spravato)    Dose: 56 mg   Provider: Rafael Chew        Past Medical History:   Diagnosis Date   • Heart murmur    • History of bilateral breast reduction surgery    • History of surgery on right wrist    • Hyperlipidemia    • Mitral valve prolapse      Social History     Socioeconomic History   • Marital status:      Spouse name: Not on file   • Number of children: Not on file   • Years of education: Not on file   • Highest education level: Not on file   Tobacco Use   • Smoking status: Former Smoker     Years: 5.00     Types: Cigarettes     Quit date:      Years since quittin.7   • Smokeless tobacco: Never Used   Substance and Sexual Activity   • Alcohol use: Yes     Alcohol/week: 7.0 standard drinks     Types: 7 Glasses of wine per week   • Drug use: No     Allergies   Allergen Reactions   • Bactrim [Sulfamethoxazole-Trimethoprim] Rash   • Sulfa Antibiotics Rash     Current Outpatient Medications   Medication Sig Dispense Refill   • cholecalciferol (VITAMIN D3) 25 MCG (1000 UT) tablet Take 1,000 Units by mouth Daily.     • cloNIDine (CATAPRES) 0.1 MG tablet Take 0.1 mg by mouth At Night As Needed for High Blood Pressure. Take 1/2 to 1 tablet nightly as needed     • Esketamine HCl, 56 MG Dose, Nasal Solution Use 4 sprays into the nostril(s) as directed by provider 1 (One) Time for 1 dose. 2 each 1   • LORazepam (ATIVAN) 0.5 MG tablet Take 0.5 mg by mouth 2 (Two) Times a Day As Needed for Anxiety.     • multivitamin with minerals (MULTIVITAMIN ADULT PO) Take 1 tablet by mouth Daily.     • propranolol (INDERAL) 10 MG tablet Take 1 tablet by mouth Daily As Needed (for palpitations). 30 tablet 11   • rosuvastatin (CRESTOR) 10 MG tablet Take 10 mg by mouth Daily.     • traZODone (DESYREL) 50 MG tablet Take 50 mg by mouth At Night As  Needed for Sleep.     • venlafaxine XR (EFFEXOR-XR) 37.5 MG 24 hr capsule Take 37.5 mg by mouth 2 (Two) Times a Day With Meals.       No current facility-administered medications for this visit.       Patient History    Aneurysmal vascular disease (including thoracic and abdominal aorta, intracranial, and peripheral arterial vessels)? No    Arteriovenous malformation No    History of intracerebral hemorrhage No    Significant cardiovascular history? No    Is the patient currently taking any of the following concomitant medication(s) that may cause sedation or blood pressure changes?   Spravato hold meds: Benzodiazepines      Does the Pt have severe hepatic impairment (Child-Cervantes class C)? No    Pregnant, breastfeeding, or planning to become pregnant? No  If childbearing potential, on effective contraception? No    Antidepressant Treatment Failure Dates   Prozac     Paxil    Zoloft    Lamotrigine    Latuda    Fluvoxamine      SPRAVATO® REMS Patient Confirmation Number: pending    PHQ-9 Dates   Baseline: 21/27 8/17/2021   9/27 8/27/2021   10/27 9/3/2021   7/27 9/10/2021                           Drug Interactions Screening    A drug-drug interactions check was made. Patient has QTc prolongating agents (effexor and trazodone) on board patient's EKG in 2019 was WNL, will continue to monitor to see if patient becomes symptomatic. Also has medication that can cause CNS depression (lorazepam, trazodone, and Spravato) - patient will hold lorazepam per Dr. Chew and will be monitored for excessive sedation.           Follow-Up Assessment & Plan    Patient diagnosed with treatment resistant depression and has tried multiple antidepressants.  Patient was assessed telephonically today. Pt reports that she has been feeling really good this week, but is experiencing a little increase in anxiety today. She denies any issues or concerns with this week's treatments. Next week is week 4 and she will be due to see Dr. Chew again.  Patient is aware of this and we will schedule 4 week follow up at next week's treatment.     Based on patient's improved PHQ-9 score, will continue dose and will order Spravato 56 mg intranasal twice weekly for administration in the [facility].  Patient will be monitored for a minimum of 2 hours according to protocol.     Referring provider did indicate that the patient should hold any medication the morning prior to administration. (Ativan as mentioned above)    Will follow-up with patient prior to each administration.     Macy Rodriguez, PharmD  08/27/21  13:54 EDT     Follow-Up Monitoring   Medication Changes No  PHQ-9 : 7/27  BP : WNL  Worsening of depression No  Emergence of suicidal thoughts and behaviors No  S/Sx of abuse/misuse  No

## 2021-09-14 ENCOUNTER — HOSPITAL ENCOUNTER (OUTPATIENT)
Dept: INFUSION THERAPY | Facility: HOSPITAL | Age: 61
Setting detail: INFUSION SERIES
Discharge: HOME | End: 2021-09-14

## 2021-09-14 VITALS — SYSTOLIC BLOOD PRESSURE: 141 MMHG | HEART RATE: 86 BPM | DIASTOLIC BLOOD PRESSURE: 88 MMHG

## 2021-09-14 DIAGNOSIS — F32.A DEPRESSION, UNSPECIFIED DEPRESSION TYPE: Primary | ICD-10-CM

## 2021-09-14 PROCEDURE — G0463 HOSPITAL OUTPT CLINIC VISIT: HCPCS

## 2021-09-14 PROCEDURE — 25010000002 ESKETAMINE HCL (56 MG DOSE) 28 MG/DEVICE SOLUTION THERAPY: Performed by: PSYCHIATRY & NEUROLOGY

## 2021-09-14 PROCEDURE — S0013 ESKETAMINE, NASAL SPRAY: HCPCS | Performed by: PSYCHIATRY & NEUROLOGY

## 2021-09-14 RX ADMIN — ESKETAMINE HYDROCHLORIDE 56 MG: 28 SOLUTION NASAL at 11:28

## 2021-09-16 ENCOUNTER — HOSPITAL ENCOUNTER (OUTPATIENT)
Dept: INFUSION THERAPY | Facility: HOSPITAL | Age: 61
Setting detail: INFUSION SERIES
Discharge: HOME | End: 2021-09-16

## 2021-09-16 VITALS — DIASTOLIC BLOOD PRESSURE: 85 MMHG | HEART RATE: 87 BPM | SYSTOLIC BLOOD PRESSURE: 145 MMHG | RESPIRATION RATE: 18 BRPM

## 2021-09-16 DIAGNOSIS — F32.A DEPRESSION, UNSPECIFIED DEPRESSION TYPE: Primary | ICD-10-CM

## 2021-09-16 PROCEDURE — 25010000002 ESKETAMINE HCL (56 MG DOSE) 28 MG/DEVICE SOLUTION THERAPY: Performed by: PSYCHIATRY & NEUROLOGY

## 2021-09-16 PROCEDURE — S0013 ESKETAMINE, NASAL SPRAY: HCPCS | Performed by: PSYCHIATRY & NEUROLOGY

## 2021-09-16 PROCEDURE — G0463 HOSPITAL OUTPT CLINIC VISIT: HCPCS

## 2021-09-16 RX ADMIN — ESKETAMINE HYDROCHLORIDE 56 MG: 28 SOLUTION NASAL at 11:24

## 2021-09-17 ENCOUNTER — TELEMEDICINE (OUTPATIENT)
Dept: PSYCHIATRY | Facility: CLINIC | Age: 61
End: 2021-09-17

## 2021-09-17 DIAGNOSIS — F42.9 OBSESSIVE-COMPULSIVE DISORDER, UNSPECIFIED TYPE: ICD-10-CM

## 2021-09-17 DIAGNOSIS — G47.00 INSOMNIA, UNSPECIFIED TYPE: ICD-10-CM

## 2021-09-17 DIAGNOSIS — F33.2 SEVERE EPISODE OF RECURRENT MAJOR DEPRESSIVE DISORDER, WITHOUT PSYCHOTIC FEATURES (HCC): Primary | ICD-10-CM

## 2021-09-17 PROCEDURE — 99214 OFFICE O/P EST MOD 30 MIN: CPT | Performed by: PSYCHIATRY & NEUROLOGY

## 2021-09-17 NOTE — PROGRESS NOTES
"      PSYCHIATRIC 4-WEEK FOLLOW-UP FOR SPRAVATO TREATMENT    Patient Identification:  Name:  Mariaa Abraham  Age:  61 y.o.  Sex:  female  :  1960  MRN:  [id number]   Visit Number:  [id number]  Primary Care Physician:  Anitha Martin APRN    SUBJECTIVE    CC/Focus of Exam: depression, Spravato evaluatiion    HPI: Mariaa Abraham \"Lyla\" is a 61 y.o. female who presents today for 4w follow-up of Spravato for treatment-resistant depression.  She is completed twice weekly treatment of 56 mg each time for the last 4 weeks.  No acutely concerning side effects of medication reported.    Lyla reports Spravato treatment isn't going as hoped. Two treatments went well, but the other six led to hopelessness, worsening depressed mood, increased emotional lability, tearfulness, feeling \"crazy\" as in scattered with poor focus, dread. These symptoms often start during or right after the treatment. However, the last two treatments, Lyla began to feel better after a couple hours, specifically with depressed feelings & hopelessness.  Lyla noted some instances of feeling scared during the treatment, although struggled to describe specifics.  Does have a significant component of anxiety about her mental health and the possibility that all treatments will fail, suggesting an illness anxiety component.  Symptoms do seem to wax and wane throughout the day per her report.    Sleep has been somewhat improved since beginning Spravato, with patient going to bed early, waking every few hours, eventually getting up around 4:56 AM.    Patient spends her time during the week taking care of 3.5y twin granddaughters, spending time around the house, trying to get out on the boat.     SPRAVATO® REMS Patient Confirmation Number: pending     PHQ-9 Dates   Baseline: 21/27 2021   9/27 2021   10/27 9/3/2021   7/27 9/10/2021       PAST MEDICAL HX:  Mitral valve prolapse    PAST PSYCHIATRIC HX:  Dx: MDD, MAHAMED, OCD  IP: denied  OP: " denied; last provider in 2019  Current meds: Effexor XR 37.5mg qAM, lorazepam 0.5mg, clonidine 0.05mg daily PRN, trazadone 25mg qHS  Previous meds:     Antidepressant Treatment Failure Dates   Prozac      Paxil     Zoloft     Lamotrigine     Latuda     Fluvoxamine        SH/SI/SA: denied/intermittent/denied  Trauma: denied    SUBSTANCE USE HX:  Reports social drinking, usually 1-2 glasses of wine every night  Denies smoking or illicit drug use    SOCIAL HX:   for 30y, two daughters, two twin granddaughters  Completed some college  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 AVG 76. . Two runs of SVT. Longest- 9 beats.   • ECHO - CONVERTED  2019    EF 65%. MVP. Mild MR.       Family History   Problem Relation Age of Onset   • Heart attack Mother          at 69 with MI   • Heart disease Mother         CABG at 62, Valve replaced at 69 after her MI   • Hypertension Mother    • Hyperlipidemia Mother    • Diabetes Mother    • Alzheimer's disease Father    • Hyperlipidemia Brother    • Heart attack Maternal Grandfather          with MI at 62       ALLERGIES:  Bactrim [sulfamethoxazole-trimethoprim] and Sulfa antibiotics    REVIEW OF SYSTEMS:  Review of Systems   Psychiatric/Behavioral: Positive for dysphoric mood and sleep disturbance. The patient is nervous/anxious.    All other systems reviewed and are negative.       OBJECTIVE    PHYSICAL EXAM:  Physical Exam  Vitals and nursing note reviewed.   Constitutional:       Appearance: She is well-developed.   HENT:      Head: Normocephalic and atraumatic.       Right Ear: External ear normal.      Left Ear: External ear normal.      Nose: Nose normal.   Eyes:      Pupils: Pupils are equal, round, and reactive to light.   Pulmonary:      Effort: Pulmonary effort is normal. No respiratory distress.      Breath sounds: Normal breath sounds.   Abdominal:      General: There is no distension.      Palpations: Abdomen is soft.   Musculoskeletal:         General: No deformity. Normal range of motion.      Cervical back: Normal range of motion and neck supple.   Skin:     General: Skin is warm.      Findings: No rash.   Neurological:      Mental Status: She is alert and oriented to person, place, and time.      Coordination: Coordination normal.       MENTAL STATUS EXAM:   Hygiene:   good  Cooperation:  Cooperative  Eye Contact:  Downcast  Psychomotor Behavior:  Appropriate  Affect:  Restricted  Hopelessness: 6  Speech:  Normal  Thought Progress:  Goal directed and Linear  Thought Content:  Mood congruent  Suicidal:  None  Homicidal:  None  Hallucinations:  None  Delusion:  None  Memory:  Intact  Orientation:  Person, Place, Time and Situation  Reliability:  good  Insight:  Good  Judgement:  Good  Impulse Control:  Good      No visits with results within 3 Week(s) from this visit.   Latest known visit with results is:   Hospital Outpatient Visit on 08/24/2021   Component Date Value Ref Range Status   • QT Interval 08/24/2021 390  ms Final   • QTC Interval 08/24/2021 458  ms Final       Chart, notes, vitals, labs and EKG personally reviewed.    ASSESSMENT & PLAN:    Major depressive disorder, severe, recurrent, without psychosis  -Continue Effexor XR 37.5 mg every morning  -Increase trazodone to 50 mg nightly  -Recommend outpatient care with psychiatrist and therapist    Evaluation for Spravato treatment  -Patient with severe symptoms of depression despite multiple medication trials and therapy  -No history of hypertension, arteriovenous malformation, hypersensitivity to ketamine  to her knowledge  -Distant history of mitral valve prolapse, with normal echo 2 years ago and normal EKG today  -PHQ 9 on 8/17/2021 elevated at 21/27.  Score since then have improved, although patient feels that his symptoms are still overall very persistent, likely worsening over the month.  -Will increase dosing to 84 mg twice weekly and monitor effect    Generalized anxiety disorder  -Continue medication as above  -Continue lorazepam 0.5 mg daily as needed.  Encouraged Lyla to try to avoid lorazepam immediately prior to ketamine treatment if possible    Obsessive-compulsive disorder  -Medication as above  -Lyla exhibits obsessive thinking about her mental health, treatment regimen and perseverates on hopelessness that no treatment will help her  -Strongly encouraged outpatient therapy    Mitral valve prolapse  -Diagnosed many years ago  -Reported to have had a normal echocardiogram 2 years ago  -EKG obtained today showed normal sinus rhythm, no acute abnormality    Return to clinic 4 weeks

## 2021-09-21 ENCOUNTER — APPOINTMENT (OUTPATIENT)
Dept: INFUSION THERAPY | Facility: HOSPITAL | Age: 61
End: 2021-09-21

## 2021-09-28 ENCOUNTER — APPOINTMENT (OUTPATIENT)
Dept: INFUSION THERAPY | Facility: HOSPITAL | Age: 61
End: 2021-09-28

## 2021-10-05 ENCOUNTER — APPOINTMENT (OUTPATIENT)
Dept: INFUSION THERAPY | Facility: HOSPITAL | Age: 61
End: 2021-10-05

## 2021-10-12 ENCOUNTER — APPOINTMENT (OUTPATIENT)
Dept: INFUSION THERAPY | Facility: HOSPITAL | Age: 61
End: 2021-10-12

## 2021-10-26 ENCOUNTER — OFFICE VISIT (OUTPATIENT)
Dept: PSYCHIATRY | Facility: CLINIC | Age: 61
End: 2021-10-26

## 2021-10-26 VITALS
HEIGHT: 65 IN | BODY MASS INDEX: 21.33 KG/M2 | SYSTOLIC BLOOD PRESSURE: 134 MMHG | WEIGHT: 128 LBS | DIASTOLIC BLOOD PRESSURE: 77 MMHG | HEART RATE: 79 BPM

## 2021-10-26 DIAGNOSIS — F33.2 SEVERE EPISODE OF RECURRENT MAJOR DEPRESSIVE DISORDER, WITHOUT PSYCHOTIC FEATURES (HCC): Primary | ICD-10-CM

## 2021-10-26 DIAGNOSIS — G47.00 INSOMNIA, UNSPECIFIED TYPE: ICD-10-CM

## 2021-10-26 DIAGNOSIS — F42.9 OBSESSIVE-COMPULSIVE DISORDER, UNSPECIFIED TYPE: ICD-10-CM

## 2021-10-26 DIAGNOSIS — Z79.899 MEDICATION MANAGEMENT: ICD-10-CM

## 2021-10-26 LAB
AMPHETAMINE CUT-OFF: NORMAL
BENZODIAZIPINE CUT-OFF: NORMAL
BUPRENORPHINE CUT-OFF: NORMAL
COCAINE CUT-OFF: NORMAL
EXTERNAL AMPHETAMINE SCREEN URINE: NEGATIVE
EXTERNAL BENZODIAZEPINE SCREEN URINE: NEGATIVE
EXTERNAL BUPRENORPHINE SCREEN URINE: NEGATIVE
EXTERNAL COCAINE SCREEN URINE: NEGATIVE
EXTERNAL MDMA: NEGATIVE
EXTERNAL METHADONE SCREEN URINE: NEGATIVE
EXTERNAL METHAMPHETAMINE SCREEN URINE: NEGATIVE
EXTERNAL OPIATES SCREEN URINE: NEGATIVE
EXTERNAL OXYCODONE SCREEN URINE: NEGATIVE
EXTERNAL THC SCREEN URINE: NEGATIVE
MDMA CUT-OFF: NORMAL
METHADONE CUT-OFF: NORMAL
METHAMPHETAMINE CUT-OFF: NORMAL
OPIATES CUT-OFF: NORMAL
OXYCODONE CUT-OFF: NORMAL
THC CUT-OFF: NORMAL

## 2021-10-26 PROCEDURE — 90792 PSYCH DIAG EVAL W/MED SRVCS: CPT | Performed by: PSYCHIATRY & NEUROLOGY

## 2021-10-26 RX ORDER — CLOMIPRAMINE HYDROCHLORIDE 25 MG/1
CAPSULE ORAL
Qty: 60 CAPSULE | Refills: 2 | Status: SHIPPED | OUTPATIENT
Start: 2021-10-26 | End: 2021-11-22 | Stop reason: SDUPTHER

## 2021-10-26 NOTE — PROGRESS NOTES
"Subjective   Mariaa Abraham is a 61 y.o. female who presents today for initial evaluation     Chief Complaint:  Depression and Anxiety     History of Present Illness: Patient is a 61-year-old  female who presents today for initial evaluation.  Patient has a history of depression and anxiety starting when she was a child.  She reports that she was called, \"worrywart.\"  She had her first major depressive episode at the age of 30 and started getting counseling and treatment with medication at that time.  She is diagnosed with OCD and generalized anxiety disorder.  She was started on Prozac which did help for an extended amount of time, almost 25 years, but 2 years ago she had another depressive episode and was started on Luvox and Lexapro but anxiety continued to worsen so she was switched to Zoloft, Latuda, Lamictal, and Paxil.  Depression and anxiety all worsened to the point that she has significant anhedonia and lack of motivation with difficulty getting out of bed, poor mood, hopelessness, worthlessness, extreme guilt, tearfulness and emotional reactivity, emotional lability, insomnia, and fatigue.  She was then placed on Effexor and Ativan and anxiety tended to worsen when Effexor was increased.  She then sought out Spravato treatment and completed 8 treatments through [facility] with Dr. Chew.  She reports that she often had a bad reaction during the visits and felt worsening mood and anxiety symptoms while on Spravato followed by short depressive spell and then return to normalcy.  It does seem that the medication treatment was helping as she return to normalcy after the initial effect of the medication but patient could not tolerate this.    Patient currently taking Effexor XR 37.5 mg twice daily, propranolol daily as needed, trazodone 50 mg nightly, Ativan 0.5 mg 3 times daily, clonidine 0.1 mg nightly as needed.  She reports that she has had more good days and bad days but still has her moments of " "sadness and despair in which she reports that it, \"hurts inside.\"  She has been getting flushing and night sweats every few weeks.  She reports that sleep is overall better but she continues to have poor sleep and does not feel rested.  Her anxiety is to the point that she does have OCD tendencies at times.  Her OCD diagnosis was made on the basis that she was having fear and thoughts about self-harm with no plan or intent but severe worrying that she might eventually have self harming or suicidal behaviors.  When Effexor was increased to 75 mg daily, she had worsening anxiety and shakiness.    Patient lives with her  and has 2 grown daughters.  She is retired and has a college degree.  She recently had twin granddaughters.  She likes gardening and volunteering.  She denies any alcohol, tobacco, or illicit substance use.    The following portions of the patient's history were reviewed and updated as appropriate: allergies, current medications, past family history, past medical history, past social history, past surgical history and problem list.      Past Medical History:  Past Medical History:   Diagnosis Date   • Heart murmur    • History of bilateral breast reduction surgery    • History of surgery on right wrist    • Hyperlipidemia    • Mitral valve prolapse        Social History:  Social History     Socioeconomic History   • Marital status:    Tobacco Use   • Smoking status: Former Smoker     Years: 5.00     Types: Cigarettes     Quit date:      Years since quittin.8   • Smokeless tobacco: Never Used   Substance and Sexual Activity   • Alcohol use: Yes     Alcohol/week: 7.0 standard drinks     Types: 7 Glasses of wine per week   • Drug use: No       Family History:  Family History   Problem Relation Age of Onset   • Heart attack Mother          at 69 with MI   • Heart disease Mother         CABG at 62, Valve replaced at 69 after her MI   • Hypertension Mother    • Hyperlipidemia " Mother    • Diabetes Mother    • Alzheimer's disease Father    • Hyperlipidemia Brother    • Heart attack Maternal Grandfather          with MI at 62       Past Surgical History:  Past Surgical History:   Procedure Laterality Date   • CONVERTED (HISTORICAL) HOLTER  2019    10 days. AVG 76. . Two runs of SVT. Longest- 9 beats.   • ECHO - CONVERTED  2019    EF 65%. MVP. Mild MR.       Problem List:  Patient Active Problem List   Diagnosis   • Palpitations   • Murmur, cardiac   • Hypercholesteremia   • Dizziness   • Anxiety   • Depression       Allergy:   Allergies   Allergen Reactions   • Bactrim [Sulfamethoxazole-Trimethoprim] Rash   • Sulfa Antibiotics Rash        Current Medications:   Current Outpatient Medications   Medication Sig Dispense Refill   • cholecalciferol (VITAMIN D3) 25 MCG (1000 UT) tablet Take 1,000 Units by mouth Daily.     • cloNIDine (CATAPRES) 0.1 MG tablet Take 0.1 mg by mouth At Night As Needed for High Blood Pressure. Take 1/2 to 1 tablet nightly as needed     • LORazepam (ATIVAN) 0.5 MG tablet Take 0.5 mg by mouth 2 (Two) Times a Day As Needed for Anxiety.     • multivitamin with minerals (MULTIVITAMIN ADULT PO) Take 1 tablet by mouth Daily.     • propranolol (INDERAL) 10 MG tablet Take 1 tablet by mouth Daily As Needed (for palpitations). 30 tablet 11   • rosuvastatin (CRESTOR) 10 MG tablet Take 10 mg by mouth Daily.     • traZODone (DESYREL) 50 MG tablet Take 50 mg by mouth At Night As Needed for Sleep.     • venlafaxine XR (EFFEXOR-XR) 37.5 MG 24 hr capsule Take 37.5 mg by mouth 2 (Two) Times a Day With Meals.       No current facility-administered medications for this visit.       Review of Symptoms:    Review of Systems   Constitutional: Positive for activity change, diaphoresis and fatigue.   HENT: Negative for congestion and sneezing.    Eyes: Negative for blurred vision and visual disturbance.   Respiratory: Negative for cough and shortness of breath.   "  Cardiovascular: Negative for chest pain and palpitations.   Gastrointestinal: Negative for nausea and vomiting.   Endocrine: Negative for cold intolerance and heat intolerance.   Genitourinary: Negative for dysuria and frequency.   Musculoskeletal: Negative for arthralgias and myalgias.   Skin: Negative for rash and bruise.   Allergic/Immunologic: Negative for environmental allergies and food allergies.   Neurological: Negative for weakness and confusion.   Hematological: Negative for adenopathy. Does not bruise/bleed easily.   Psychiatric/Behavioral: Positive for dysphoric mood, sleep disturbance and stress. The patient is nervous/anxious.          Physical Exam:   Blood pressure 134/77, pulse 79, height 165.1 cm (65\"), weight 58.1 kg (128 lb).    Appearance: CF of stated age, well dressed, NAD   Gait, Station, Strength: WNL    Mental Status Exam:   Hygiene:   good  Cooperation:  Cooperative  Eye Contact:  Good  Psychomotor Behavior:  Appropriate  Affect:  Full range  Mood: depressed and anxious  Hopelessness: Optimistic  Speech:  Normal  Thought Process:  Goal directed and Linear  Thought Content:  Normal and Mood congruent  Suicidal:  None  Homicidal:  None  Hallucinations:  None  Delusion:  None  Memory:  Intact  Orientation:  Person, Place, Time and Situation  Reliability:  good  Insight:  Good  Judgement:  Good  Impulse Control:  Good      Lab Results:   No visits with results within 1 Month(s) from this visit.   Latest known visit with results is:   Hospital Outpatient Visit on 08/24/2021   Component Date Value Ref Range Status   • QT Interval 08/24/2021 390  ms Final   • QTC Interval 08/24/2021 458  ms Final       Assessment/Plan   Diagnoses and all orders for this visit:    1. Severe episode of recurrent major depressive disorder, without psychotic features (HCC) (Primary)  -     clomiPRAMINE (ANAFRANIL) 25 MG capsule; Take 1 tablet PO nightly for one week, then increase to 1 tablet PO daily for OCD.  " Dispense: 60 capsule; Refill: 2    2. Medication management  -     [facility] Drug Screen    3. Obsessive-compulsive disorder, unspecified type  -     clomiPRAMINE (ANAFRANIL) 25 MG capsule; Take 1 tablet PO nightly for one week, then increase to 1 tablet PO daily for OCD.  Dispense: 60 capsule; Refill: 2    4. Insomnia, unspecified type  -     clomiPRAMINE (ANAFRANIL) 25 MG capsule; Take 1 tablet PO nightly for one week, then increase to 1 tablet PO daily for OCD.  Dispense: 60 capsule; Refill: 2    -Patient presenting for initial evaluation with a history of OCD, depression, anxiety, and insomnia.  She is currently having improved mood and anxiety symptoms but it continues to be symptomatic despite having more good days than bad days.  She continues to have difficulty with sleep even with current sleep regimen and OCD symptoms are significant but reduced.  -Reviewed previous available documentation  -Reviewed most recent available labs   -JARRETT reviewed and appropriate. Patient counseled on use of controlled substances.   -Discontinue Effexor by tapering to 37.5 mg p.o. daily for 1 week and discontinuing the medication  -Continue trazodone 50 mg p.o. nightly as needed for insomnia  -Continue propranolol 10 mg daily as needed for anxiety  -Continue Ativan 0.5 mg 3 times daily as needed for anxiety and OCD  -Continue clonidine 0.1 mg nightly as needed for insomnia  -Start clomipramine 25 mg nightly for 1 week and then increase to 50 mg nightly if tolerated for anxiety, OCD, and mood.  This medication is sedating so it may also help with insomnia.    Visit Diagnoses:    ICD-10-CM ICD-9-CM   1. Severe episode of recurrent major depressive disorder, without psychotic features ([facility])  F33.2 296.33   2. Medication management  Z79.899 V58.69   3. Obsessive-compulsive disorder, unspecified type  F42.9 300.3   4. Insomnia, unspecified type  G47.00 780.52       TREATMENT PLAN - SHORT AND LONG-TERM GOALS: Continue supportive  psychotherapy efforts and medications as indicated. Treatment and medication options discussed during today's visit. Patient acknowledged and verbally consented to continue with current treatment plan and was educated on the importance of compliance with treatment and follow-up appointments.    MEDICATION ISSUES:    Discussed medication options and treatment plan of prescribed medication as well as the risks, benefits, and side effects including potential falls, possible impaired driving and metabolic adversities among others. Patient is agreeable to call the office with any worsening of symptoms or onset of side effects. Patient is agreeable to call 911 or go to the nearest ER should he/she begin having SI/HI.     MEDS ORDERED DURING VISIT:  New Medications Ordered This Visit   Medications   • clomiPRAMINE (ANAFRANIL) 25 MG capsule     Sig: Take 1 tablet PO nightly for one week, then increase to 1 tablet PO daily for OCD.     Dispense:  60 capsule     Refill:  2       FOLLOW UP:  Return in about 4 weeks (around 11/23/2021).             This document has been electronically signed by Elivn Cruz MD  October 26, 2021 13:19 EDT    Dictated using Dragon Dictation.

## 2021-10-29 ENCOUNTER — PATIENT ROUNDING (BHMG ONLY) (OUTPATIENT)
Dept: PSYCHIATRY | Facility: CLINIC | Age: 61
End: 2021-10-29

## 2021-10-29 NOTE — PROGRESS NOTES
October 29, 2021    Hello, may I speak with Mariaa Abraham?    My name is GUSTAVO ARIAS      I am  with [facility]  [address] [place] [id number] [phone].    Before we get started may I verify your date of birth? 1960    I am calling to officially welcome you to our practice and ask about your recent visit. Is this a good time to talk? YES    Tell me about your visit with us. What things went well? BETTER THAN EXPECTED. ENTIRE STAFF WAS SO GOOD       We're always looking for ways to make our patients' experiences even better. Do you have recommendations on ways we may improve? NO    Overall were you satisfied with your first visit to our practice? YES       I appreciate you taking the time to speak with me today. Is there anything else I can do for you? NO      Thank you, and have a great day.

## 2021-11-22 ENCOUNTER — TELEMEDICINE (OUTPATIENT)
Dept: PSYCHIATRY | Facility: CLINIC | Age: 61
End: 2021-11-22

## 2021-11-22 DIAGNOSIS — F33.0 MILD EPISODE OF RECURRENT MAJOR DEPRESSIVE DISORDER (HCC): ICD-10-CM

## 2021-11-22 DIAGNOSIS — F42.9 OBSESSIVE-COMPULSIVE DISORDER, UNSPECIFIED TYPE: ICD-10-CM

## 2021-11-22 DIAGNOSIS — G47.00 INSOMNIA, UNSPECIFIED TYPE: ICD-10-CM

## 2021-11-22 PROCEDURE — 99214 OFFICE O/P EST MOD 30 MIN: CPT | Performed by: PSYCHIATRY & NEUROLOGY

## 2021-11-22 RX ORDER — CLOMIPRAMINE HYDROCHLORIDE 25 MG/1
25 CAPSULE ORAL NIGHTLY
Qty: 30 CAPSULE | Refills: 1 | Status: SHIPPED | OUTPATIENT
Start: 2021-11-22 | End: 2022-01-26 | Stop reason: SDUPTHER

## 2021-11-24 NOTE — PROGRESS NOTES
Subjective   Mariaa Abraham is a 61 y.o. female who presents today for follow up    Chief Complaint:  Depression and Anxiety     This provider is located at [facility], [address] [place] [address]. The Patient is seen remotely at home, using Epic Mychart. Patient is being seen via telehealth and stated they are in a secure environment for this session. The patient’s condition being diagnosed/treated is appropriate for telemedicine. The provider identified himself as well as his credentials.   The patient gave consent to be seen remotely, and when consent is given they understand that the consent allows for patient identifiable information to be sent to a third party as needed.   They may refuse to be seen remotely at any time. The electronic data is encrypted and password protected, and the patient has been advised of the potential risks to privacy not withstanding such measures      History of Present Illness: Patient presenting today for follow-up.  Last visit was management, with the patient.  She reports that after last visit, she has had improved mood and anxiety symptoms.  She states that she did have some nausea, anxiety, and fatigue with the increased dose of clomipramine after 1 week and went back down to the lower dose.  She reports that after 1 week of being on the medication, she is sleeping much better.  She notes that she has not had any crying spells and the entire month since last visit.  She reports that her OCD symptoms are less severe, less frequent, and resolve more quickly.  She also endorses that her sleep has improved.  She is not having any current medication side effects.  She denies any issues with sleep or appetite.  She denies SI/HI/AVH.    The following portions of the patient's history were reviewed and updated as appropriate: allergies, current medications, past family history, past medical history, past social history, past surgical history and problem list.      Past  Medical History:  Past Medical History:   Diagnosis Date   • Heart murmur    • History of bilateral breast reduction surgery    • History of surgery on right wrist    • Hyperlipidemia    • Mitral valve prolapse        Social History:  Social History     Socioeconomic History   • Marital status:    Tobacco Use   • Smoking status: Former Smoker     Years: 5.00     Types: Cigarettes     Quit date:      Years since quittin.9   • Smokeless tobacco: Never Used   Vaping Use   • Vaping Use: Never used   Substance and Sexual Activity   • Alcohol use: Yes     Alcohol/week: 7.0 standard drinks     Types: 7 Glasses of wine per week   • Drug use: No       Family History:  Family History   Problem Relation Age of Onset   • Heart attack Mother          at 69 with MI   • Heart disease Mother         CABG at 62, Valve replaced at 69 after her MI   • Hypertension Mother    • Hyperlipidemia Mother    • Diabetes Mother    • Alzheimer's disease Father    • Hyperlipidemia Brother    • Heart attack Maternal Grandfather          with MI at 62       Past Surgical History:  Past Surgical History:   Procedure Laterality Date   • CONVERTED (HISTORICAL) HOLTER  2019    10 days. AVG 76. . Two runs of SVT. Longest- 9 beats.   • ECHO - CONVERTED  2019    EF 65%. MVP. Mild MR.       Problem List:  Patient Active Problem List   Diagnosis   • Palpitations   • Murmur, cardiac   • Hypercholesteremia   • Dizziness   • Anxiety   • Depression       Allergy:   Allergies   Allergen Reactions   • Bactrim [Sulfamethoxazole-Trimethoprim] Rash   • Sulfa Antibiotics Rash        Current Medications:   Current Outpatient Medications   Medication Sig Dispense Refill   • cholecalciferol (VITAMIN D3) 25 MCG (1000 UT) tablet Take 1,000 Units by mouth Daily.     • clomiPRAMINE (ANAFRANIL) 25 MG capsule Take 1 capsule by mouth Every Night. Take 1 tablet PO nightly for one week, then increase to 1 tablet PO daily for OCD. 30  capsule 1   • cloNIDine (CATAPRES) 0.1 MG tablet Take 0.1 mg by mouth At Night As Needed for High Blood Pressure. Take 1/2 to 1 tablet nightly as needed     • LORazepam (ATIVAN) 0.5 MG tablet Take 0.5 mg by mouth 2 (Two) Times a Day As Needed for Anxiety.     • multivitamin with minerals (MULTIVITAMIN ADULT PO) Take 1 tablet by mouth Daily.     • rosuvastatin (CRESTOR) 10 MG tablet Take 10 mg by mouth Daily.     • traZODone (DESYREL) 50 MG tablet Take 50 mg by mouth At Night As Needed for Sleep.       No current facility-administered medications for this visit.       Review of Symptoms:    Review of Systems   Constitutional: Negative for activity change, diaphoresis and fatigue.   HENT: Negative for congestion and sneezing.    Eyes: Negative for blurred vision and visual disturbance.   Respiratory: Negative for cough and shortness of breath.    Cardiovascular: Negative for chest pain and palpitations.   Gastrointestinal: Negative for nausea and vomiting.   Endocrine: Negative for cold intolerance and heat intolerance.   Genitourinary: Negative for dysuria and frequency.   Musculoskeletal: Negative for arthralgias and myalgias.   Skin: Negative for rash and bruise.   Allergic/Immunologic: Negative for environmental allergies and food allergies.   Neurological: Negative for weakness and confusion.   Hematological: Negative for adenopathy. Does not bruise/bleed easily.   Psychiatric/Behavioral: Positive for stress. Negative for dysphoric mood and sleep disturbance. The patient is nervous/anxious.          Physical Exam:   There were no vitals taken for this visit.  Video visit    Appearance: CF of stated age, well dressed, NAD   Gait, Station, Strength: Video visit    Mental Status Exam:   Hygiene:   good  Cooperation:  Cooperative  Eye Contact:  Good  Psychomotor Behavior:  Appropriate  Affect:  Full range  Mood: normal, improved  Hopelessness: Optimistic  Speech:  Normal  Thought Process:  Goal directed and  Linear  Thought Content:  Normal and Mood congruent  Suicidal:  None  Homicidal:  None  Hallucinations:  None  Delusion:  None  Memory:  Intact  Orientation:  Person, Place, Time and Situation  Reliability:  good  Insight:  Good  Judgement:  Good  Impulse Control:  Good      Lab Results:   Office Visit on 10/26/2021   Component Date Value Ref Range Status   • External Amphetamine Screen Urine 10/26/2021 Negative   Final   • Amphetamine Cut-Off 10/26/2021 1000ng/ml   Final   • External Benzodiazepine Screen Uri* 10/26/2021 Negative   Final   • Benzodiazipine Cut-Off 10/26/2021 300ng/ml   Final   • External Cocaine Screen Urine 10/26/2021 Negative   Final   • Cocaine Cut-Off 10/26/2021 300ng/ml   Final   • External THC Screen Urine 10/26/2021 Negative   Final   • THC Cut-Off 10/26/2021 50ng/ml   Final   • External Methadone Screen Urine 10/26/2021 Negative   Final   • Methadone Cut-Off 10/26/2021 300ng/ml   Final   • External Methamphetamine Screen Ur* 10/26/2021 Negative   Final   • Methamphetamine Cut-Off 10/26/2021 1000ng/ml   Final   • External Oxycodone Screen Urine 10/26/2021 Negative   Final   • Oxycodone Cut-Off 10/26/2021 100ng/ml   Final   • External Buprenorphine Screen Urine 10/26/2021 Negative   Final   • Buprenorphine Cut-Off 10/26/2021 10ng/ml   Final   • External MDMA 10/26/2021 Negative   Final   • MDMA Cut-Off 10/26/2021 500ng/ml   Final   • External Opiates Screen Urine 10/26/2021 Negative   Final   • Opiates Cut-Off 10/26/2021 300ng/ml   Final       Assessment/Plan   Diagnoses and all orders for this visit:    1. Severe episode of recurrent major depressive disorder, without psychotic features (HCC)  -     clomiPRAMINE (ANAFRANIL) 25 MG capsule; Take 1 capsule by mouth Every Night. Take 1 tablet PO nightly for one week, then increase to 1 tablet PO daily for OCD.  Dispense: 30 capsule; Refill: 1    2. Obsessive-compulsive disorder, unspecified type  -     clomiPRAMINE (ANAFRANIL) 25 MG capsule;  Take 1 capsule by mouth Every Night. Take 1 tablet PO nightly for one week, then increase to 1 tablet PO daily for OCD.  Dispense: 30 capsule; Refill: 1    3. Insomnia, unspecified type  -     clomiPRAMINE (ANAFRANIL) 25 MG capsule; Take 1 capsule by mouth Every Night. Take 1 tablet PO nightly for one week, then increase to 1 tablet PO daily for OCD.  Dispense: 30 capsule; Refill: 1    Other orders  -     SCANNED - LABS    -Patient reports that since last visit her OCD symptoms are less severe, less frequent, and resolve more quickly.  She reports her depression has improved and she has not had any crying spells in 1 month.  Her anxiety has reduced and her sleep has also improved.  -Reviewed previous available documentation  -Reviewed most recent available labs   -JARRETT reviewed and appropriate. Patient counseled on use of controlled substances.   -Continue Effexor XR 37.5 mg p.o. daily for mood, anxiety, OCD: Plan was to taper off of this medication but she is doing well on low dose with the addition of clomipramine, could consider discontinuing and increasing clomipramine in the future  -Continue trazodone 50 mg p.o. nightly as needed for insomnia  -Discontinue propranolol as patient has not required the medication  -Continue Ativan 0.5 mg 3 times daily as needed for anxiety and OCD  -Continue clonidine 0.1 mg nightly as needed for insomnia  -Continue clomipramine 25 mg nightly for anxiety, OCD, and mood as well as insomnia due to sedating effect  -Approximate appointment time 11:23 AM to 11:40 AM via video visit    Visit Diagnoses:    ICD-10-CM ICD-9-CM   1. Severe episode of recurrent major depressive disorder, without psychotic features (HCC)  F33.2 296.33   2. Obsessive-compulsive disorder, unspecified type  F42.9 300.3   3. Insomnia, unspecified type  G47.00 780.52       TREATMENT PLAN - SHORT AND LONG-TERM GOALS: Continue supportive psychotherapy efforts and medications as indicated. Treatment and  medication options discussed during today's visit. Patient acknowledged and verbally consented to continue with current treatment plan and was educated on the importance of compliance with treatment and follow-up appointments.    MEDICATION ISSUES:    Discussed medication options and treatment plan of prescribed medication as well as the risks, benefits, and side effects including potential falls, possible impaired driving and metabolic adversities among others. Patient is agreeable to call the office with any worsening of symptoms or onset of side effects. Patient is agreeable to call 911 or go to the nearest ER should he/she begin having SI/HI.     MEDS ORDERED DURING VISIT:  New Medications Ordered This Visit   Medications   • clomiPRAMINE (ANAFRANIL) 25 MG capsule     Sig: Take 1 capsule by mouth Every Night. Take 1 tablet PO nightly for one week, then increase to 1 tablet PO daily for OCD.     Dispense:  30 capsule     Refill:  1       FOLLOW UP:  Return in about 4 weeks (around 12/20/2021).             This document has been electronically signed by Elvin Cruz MD  November 24, 2021 12:49 EST    Dictated using Dragon Dictation.

## 2022-01-26 ENCOUNTER — TELEMEDICINE (OUTPATIENT)
Dept: PSYCHIATRY | Facility: CLINIC | Age: 62
End: 2022-01-26

## 2022-01-26 DIAGNOSIS — F33.0 MILD EPISODE OF RECURRENT MAJOR DEPRESSIVE DISORDER: ICD-10-CM

## 2022-01-26 DIAGNOSIS — G47.00 INSOMNIA, UNSPECIFIED TYPE: ICD-10-CM

## 2022-01-26 DIAGNOSIS — F42.9 OBSESSIVE-COMPULSIVE DISORDER, UNSPECIFIED TYPE: ICD-10-CM

## 2022-01-26 PROCEDURE — 99214 OFFICE O/P EST MOD 30 MIN: CPT | Performed by: PSYCHIATRY & NEUROLOGY

## 2022-01-26 RX ORDER — VENLAFAXINE HYDROCHLORIDE 37.5 MG/1
37.5 CAPSULE, EXTENDED RELEASE ORAL DAILY
Qty: 30 CAPSULE | Refills: 2 | Status: SHIPPED | OUTPATIENT
Start: 2022-01-26 | End: 2022-10-12 | Stop reason: SDUPTHER

## 2022-01-26 RX ORDER — CLOMIPRAMINE HYDROCHLORIDE 50 MG/1
50 CAPSULE ORAL NIGHTLY
Qty: 30 CAPSULE | Refills: 2 | Status: SHIPPED | OUTPATIENT
Start: 2022-01-26 | End: 2022-03-07 | Stop reason: SDUPTHER

## 2022-01-28 NOTE — PROGRESS NOTES
Subjective   Mariaa Abraham is a 61 y.o. female who presents today for follow up    Chief Complaint:  Depression and Anxiety     This provider is located at [facility], [address] [place] [address]. The Patient is seen remotely at home, using Epic Mychart. Patient is being seen via telehealth and stated they are in a secure environment for this session. The patient’s condition being diagnosed/treated is appropriate for telemedicine. The provider identified himself as well as his credentials.   The patient gave consent to be seen remotely, and when consent is given they understand that the consent allows for patient identifiable information to be sent to a third party as needed.   They may refuse to be seen remotely at any time. The electronic data is encrypted and password protected, and the patient has been advised of the potential risks to privacy not withstanding such measures      History of Present Illness: Patient reports that since last visit, her whole family has had COVID-19.  She states that after medication adjustments, she felt normal for a few weeks but thinks her anxiety is creeping back in.  She is not having any current medication side effects.  She denies any issues with sleep or appetite.  She denies SI/HI/AVH.    The following portions of the patient's history were reviewed and updated as appropriate: allergies, current medications, past family history, past medical history, past social history, past surgical history and problem list.      Past Medical History:  Past Medical History:   Diagnosis Date   • Heart murmur    • History of bilateral breast reduction surgery    • History of surgery on right wrist    • Hyperlipidemia    • Mitral valve prolapse        Social History:  Social History     Socioeconomic History   • Marital status:    Tobacco Use   • Smoking status: Former Smoker     Years: 5.00     Types: Cigarettes     Quit date:      Years since quittin.1   •  Smokeless tobacco: Never Used   Vaping Use   • Vaping Use: Never used   Substance and Sexual Activity   • Alcohol use: Yes     Alcohol/week: 7.0 standard drinks     Types: 7 Glasses of wine per week   • Drug use: No       Family History:  Family History   Problem Relation Age of Onset   • Heart attack Mother          at 69 with MI   • Heart disease Mother         CABG at 62, Valve replaced at 69 after her MI   • Hypertension Mother    • Hyperlipidemia Mother    • Diabetes Mother    • Alzheimer's disease Father    • Hyperlipidemia Brother    • Heart attack Maternal Grandfather          with MI at 62       Past Surgical History:  Past Surgical History:   Procedure Laterality Date   • CONVERTED (HISTORICAL) HOLTER  2019    10 days. AVG 76. . Two runs of SVT. Longest- 9 beats.   • ECHO - CONVERTED  2019    EF 65%. MVP. Mild MR.       Problem List:  Patient Active Problem List   Diagnosis   • Palpitations   • Murmur, cardiac   • Hypercholesteremia   • Dizziness   • Anxiety   • Depression       Allergy:   Allergies   Allergen Reactions   • Bactrim [Sulfamethoxazole-Trimethoprim] Rash   • Sulfa Antibiotics Rash        Current Medications:   Current Outpatient Medications   Medication Sig Dispense Refill   • cholecalciferol (VITAMIN D3) 25 MCG (1000 UT) tablet Take 1,000 Units by mouth Daily.     • clomiPRAMINE (ANAFRANIL) 50 MG capsule Take 1 capsule by mouth Every Night. 30 capsule 2   • cloNIDine (CATAPRES) 0.1 MG tablet Take 0.1 mg by mouth At Night As Needed for High Blood Pressure. Take 1/2 to 1 tablet nightly as needed     • LORazepam (ATIVAN) 0.5 MG tablet Take 0.5 mg by mouth 2 (Two) Times a Day As Needed for Anxiety.     • multivitamin with minerals (MULTIVITAMIN ADULT PO) Take 1 tablet by mouth Daily.     • rosuvastatin (CRESTOR) 10 MG tablet Take 10 mg by mouth Daily.     • traZODone (DESYREL) 50 MG tablet Take 50 mg by mouth At Night As Needed for Sleep.     • venlafaxine XR (Effexor  XR) 37.5 MG 24 hr capsule Take 1 capsule by mouth Daily. 30 capsule 2     No current facility-administered medications for this visit.       Review of Symptoms:    Review of Systems   Constitutional: Negative for activity change, diaphoresis and fatigue.   HENT: Negative for congestion and sneezing.    Eyes: Negative for blurred vision and visual disturbance.   Respiratory: Negative for cough and shortness of breath.    Cardiovascular: Negative for chest pain and palpitations.   Gastrointestinal: Negative for nausea and vomiting.   Endocrine: Negative for cold intolerance and heat intolerance.   Genitourinary: Negative for dysuria and frequency.   Musculoskeletal: Negative for arthralgias and myalgias.   Skin: Negative for rash and wound.   Allergic/Immunologic: Negative for environmental allergies and food allergies.   Neurological: Negative for weakness and confusion.   Hematological: Negative for adenopathy. Does not bruise/bleed easily.   Psychiatric/Behavioral: Positive for stress. Negative for dysphoric mood and sleep disturbance. The patient is nervous/anxious.          Physical Exam:   There were no vitals taken for this visit.  Video visit    Appearance: CF of stated age, well dressed, NAD   Gait, Station, Strength: Video visit    Mental Status Exam:   Hygiene:   good  Cooperation:  Cooperative  Eye Contact:  Good  Psychomotor Behavior:  Appropriate  Affect:  Full range  Mood: anxious,   Hopelessness: Optimistic  Speech:  Normal  Thought Process:  Goal directed and Linear  Thought Content:  Normal and Mood congruent  Suicidal:  None  Homicidal:  None  Hallucinations:  None  Delusion:  None  Memory:  Intact  Orientation:  Person, Place, Time and Situation  Reliability:  good  Insight:  Good  Judgement:  Good  Impulse Control:  Good      Lab Results:   No visits with results within 1 Month(s) from this visit.   Latest known visit with results is:   Office Visit on 10/26/2021   Component Date Value Ref Range  Status   • External Amphetamine Screen Urine 10/26/2021 Negative   Final   • Amphetamine Cut-Off 10/26/2021 1000ng/ml   Final   • External Benzodiazepine Screen Uri* 10/26/2021 Negative   Final   • Benzodiazipine Cut-Off 10/26/2021 300ng/ml   Final   • External Cocaine Screen Urine 10/26/2021 Negative   Final   • Cocaine Cut-Off 10/26/2021 300ng/ml   Final   • External THC Screen Urine 10/26/2021 Negative   Final   • THC Cut-Off 10/26/2021 50ng/ml   Final   • External Methadone Screen Urine 10/26/2021 Negative   Final   • Methadone Cut-Off 10/26/2021 300ng/ml   Final   • External Methamphetamine Screen Ur* 10/26/2021 Negative   Final   • Methamphetamine Cut-Off 10/26/2021 1000ng/ml   Final   • External Oxycodone Screen Urine 10/26/2021 Negative   Final   • Oxycodone Cut-Off 10/26/2021 100ng/ml   Final   • External Buprenorphine Screen Urine 10/26/2021 Negative   Final   • Buprenorphine Cut-Off 10/26/2021 10ng/ml   Final   • External MDMA 10/26/2021 Negative   Final   • MDMA Cut-Off 10/26/2021 500ng/ml   Final   • External Opiates Screen Urine 10/26/2021 Negative   Final   • Opiates Cut-Off 10/26/2021 300ng/ml   Final       Assessment/Plan   Diagnoses and all orders for this visit:    1. Mild episode of recurrent major depressive disorder (HCC)  -     clomiPRAMINE (ANAFRANIL) 50 MG capsule; Take 1 capsule by mouth Every Night.  Dispense: 30 capsule; Refill: 2    2. Obsessive-compulsive disorder, unspecified type  -     clomiPRAMINE (ANAFRANIL) 50 MG capsule; Take 1 capsule by mouth Every Night.  Dispense: 30 capsule; Refill: 2    3. Insomnia, unspecified type  -     clomiPRAMINE (ANAFRANIL) 50 MG capsule; Take 1 capsule by mouth Every Night.  Dispense: 30 capsule; Refill: 2    Other orders  -     venlafaxine XR (Effexor XR) 37.5 MG 24 hr capsule; Take 1 capsule by mouth Daily.  Dispense: 30 capsule; Refill: 2    -Patient was doing relatively well but for the last few weeks has noticed her anxiety has been  recurring.  -Reviewed previous available documentation  -Reviewed most recent available labs   -JARRETT reviewed and appropriate. Patient counseled on use of controlled substances.   -Continue Effexor XR 37.5 mg p.o. daily for mood, anxiety, OCD: Plan was to taper off of this medication but she is doing well on low dose with the addition of clomipramine, could consider discontinuing and increasing clomipramine in the future  -Continue trazodone 50 mg p.o. nightly as needed for insomnia  -Continue Ativan 0.5 mg 3 times daily as needed for anxiety and OCD  -Continue clonidine 0.1 mg nightly as needed for insomnia  -Increase clomipramine 25 mg to 50 mgnightly for anxiety, OCD, and mood as well as insomnia due to sedating effect  -Approximate appointment time 11:30 AM to 11:45 AM via video visit    Visit Diagnoses:    ICD-10-CM ICD-9-CM   1. Mild episode of recurrent major depressive disorder (HCC)  F33.0 296.31   2. Obsessive-compulsive disorder, unspecified type  F42.9 300.3   3. Insomnia, unspecified type  G47.00 780.52       TREATMENT PLAN - SHORT AND LONG-TERM GOALS: Continue supportive psychotherapy efforts and medications as indicated. Treatment and medication options discussed during today's visit. Patient acknowledged and verbally consented to continue with current treatment plan and was educated on the importance of compliance with treatment and follow-up appointments.    MEDICATION ISSUES:    Discussed medication options and treatment plan of prescribed medication as well as the risks, benefits, and side effects including potential falls, possible impaired driving and metabolic adversities among others. Patient is agreeable to call the office with any worsening of symptoms or onset of side effects. Patient is agreeable to call 911 or go to the nearest ER should he/she begin having SI/HI.     MEDS ORDERED DURING VISIT:  New Medications Ordered This Visit   Medications   • clomiPRAMINE (ANAFRANIL) 50 MG capsule      Sig: Take 1 capsule by mouth Every Night.     Dispense:  30 capsule     Refill:  2   • venlafaxine XR (Effexor XR) 37.5 MG 24 hr capsule     Sig: Take 1 capsule by mouth Daily.     Dispense:  30 capsule     Refill:  2       FOLLOW UP:  Return in about 4 weeks (around 2/23/2022).             This document has been electronically signed by Elvin Cruz MD  January 28, 2022 15:11 EST    Dictated using Dragon Dictation.

## 2022-03-07 ENCOUNTER — TELEPHONE (OUTPATIENT)
Dept: PSYCHIATRY | Facility: CLINIC | Age: 62
End: 2022-03-07

## 2022-03-07 DIAGNOSIS — F42.9 OBSESSIVE-COMPULSIVE DISORDER, UNSPECIFIED TYPE: ICD-10-CM

## 2022-03-07 DIAGNOSIS — G47.00 INSOMNIA, UNSPECIFIED TYPE: ICD-10-CM

## 2022-03-07 DIAGNOSIS — F33.0 MILD EPISODE OF RECURRENT MAJOR DEPRESSIVE DISORDER: ICD-10-CM

## 2022-03-07 RX ORDER — CLOMIPRAMINE HYDROCHLORIDE 25 MG/1
25 CAPSULE ORAL 2 TIMES DAILY
Qty: 60 CAPSULE | Refills: 2 | Status: SHIPPED | OUTPATIENT
Start: 2022-03-07 | End: 2022-05-31

## 2022-04-12 ENCOUNTER — TELEMEDICINE (OUTPATIENT)
Dept: PSYCHIATRY | Facility: CLINIC | Age: 62
End: 2022-04-12

## 2022-04-12 DIAGNOSIS — G47.00 INSOMNIA, UNSPECIFIED TYPE: ICD-10-CM

## 2022-04-12 DIAGNOSIS — F42.9 OBSESSIVE-COMPULSIVE DISORDER, UNSPECIFIED TYPE: ICD-10-CM

## 2022-04-12 DIAGNOSIS — F33.41 RECURRENT MAJOR DEPRESSIVE DISORDER, IN PARTIAL REMISSION: Primary | ICD-10-CM

## 2022-04-12 PROCEDURE — 99214 OFFICE O/P EST MOD 30 MIN: CPT | Performed by: PSYCHIATRY & NEUROLOGY

## 2022-04-12 NOTE — PROGRESS NOTES
Subjective   Mariaa Abraham is a 61 y.o. female who presents today for follow up    Chief Complaint:  Depression and Anxiety     This provider is located at [facility], [address] [place] [address]. The Patient is seen remotely at home, using Epic Mychart. Patient is being seen via telehealth and stated they are in a secure environment for this session. The patient’s condition being diagnosed/treated is appropriate for telemedicine. The provider identified himself as well as his credentials.   The patient gave consent to be seen remotely, and when consent is given they understand that the consent allows for patient identifiable information to be sent to a third party as needed.   They may refuse to be seen remotely at any time. The electronic data is encrypted and password protected, and the patient has been advised of the potential risks to privacy not withstanding such measures      History of Present Illness: Patient presenting today for follow-up.  She reports that since last visit, she has been doing relatively well.  She endorses that for the past 2 weeks specifically, she has had little to no anxiety and her OCD symptoms have been minimal.  She recently took a trip to [place] with her daughter and 2 granddaughters and her and her family planted their garden for the year.  Patient also endorses that she has been volunteering at the food pantry.  She is not having any medication side effects.  Sleep and appetite are appropriate.  She denies SI/HI/AVH.    The following portions of the patient's history were reviewed and updated as appropriate: allergies, current medications, past family history, past medical history, past social history, past surgical history and problem list.      Past Medical History:  Past Medical History:   Diagnosis Date   • Heart murmur    • History of bilateral breast reduction surgery    • History of surgery on right wrist    • Hyperlipidemia    • Mitral valve prolapse         Social History:  Social History     Socioeconomic History   • Marital status:    Tobacco Use   • Smoking status: Former Smoker     Years: 5.00     Types: Cigarettes     Quit date:      Years since quittin.3   • Smokeless tobacco: Never Used   Vaping Use   • Vaping Use: Never used   Substance and Sexual Activity   • Alcohol use: Yes     Alcohol/week: 7.0 standard drinks     Types: 7 Glasses of wine per week   • Drug use: No       Family History:  Family History   Problem Relation Age of Onset   • Heart attack Mother          at 69 with MI   • Heart disease Mother         CABG at 62, Valve replaced at 69 after her MI   • Hypertension Mother    • Hyperlipidemia Mother    • Diabetes Mother    • Alzheimer's disease Father    • Hyperlipidemia Brother    • Heart attack Maternal Grandfather          with MI at 62       Past Surgical History:  Past Surgical History:   Procedure Laterality Date   • CONVERTED (HISTORICAL) HOLTER  2019    10 days. AVG 76. . Two runs of SVT. Longest- 9 beats.   • ECHO - CONVERTED  2019    EF 65%. MVP. Mild MR.       Problem List:  Patient Active Problem List   Diagnosis   • Palpitations   • Murmur, cardiac   • Hypercholesteremia   • Dizziness   • Anxiety   • Depression       Allergy:   Allergies   Allergen Reactions   • Bactrim [Sulfamethoxazole-Trimethoprim] Rash   • Sulfa Antibiotics Rash        Current Medications:   Current Outpatient Medications   Medication Sig Dispense Refill   • cholecalciferol (VITAMIN D3) 25 MCG (1000 UT) tablet Take 1,000 Units by mouth Daily.     • clomiPRAMINE (ANAFRANIL) 25 MG capsule Take 1 capsule by mouth 2 (Two) Times a Day. 60 capsule 2   • cloNIDine (CATAPRES) 0.1 MG tablet Take 0.1 mg by mouth At Night As Needed for High Blood Pressure. Take 1/2 to 1 tablet nightly as needed     • LORazepam (ATIVAN) 0.5 MG tablet Take 0.5 mg by mouth 2 (Two) Times a Day As Needed for Anxiety.     • multivitamin with minerals  (MULTIVITAMIN ADULT PO) Take 1 tablet by mouth Daily.     • rosuvastatin (CRESTOR) 10 MG tablet Take 10 mg by mouth Daily.     • traZODone (DESYREL) 50 MG tablet Take 50 mg by mouth At Night As Needed for Sleep.     • venlafaxine XR (Effexor XR) 37.5 MG 24 hr capsule Take 1 capsule by mouth Daily. 30 capsule 2     No current facility-administered medications for this visit.       Review of Symptoms:    Review of Systems   Constitutional: Negative for activity change, diaphoresis and fatigue.   HENT: Negative for congestion and sneezing.    Eyes: Negative for blurred vision and visual disturbance.   Respiratory: Negative for cough and shortness of breath.    Cardiovascular: Negative for chest pain and palpitations.   Gastrointestinal: Negative for nausea and vomiting.   Endocrine: Negative for cold intolerance and heat intolerance.   Genitourinary: Negative for dysuria and frequency.   Musculoskeletal: Negative for arthralgias and myalgias.   Skin: Negative for rash and wound.   Allergic/Immunologic: Negative for environmental allergies and food allergies.   Neurological: Negative for weakness and confusion.   Hematological: Negative for adenopathy. Does not bruise/bleed easily.   Psychiatric/Behavioral: Negative for dysphoric mood, sleep disturbance and stress. The patient is not nervous/anxious.          Physical Exam:   There were no vitals taken for this visit.  Video visit    Appearance: CF of stated age, well dressed, NAD   Gait, Station, Strength: Video visit    Mental Status Exam:   Hygiene:   good  Cooperation:  Cooperative  Eye Contact:  Good  Psychomotor Behavior:  Appropriate  Affect:  Full range  Mood: normal, improved    Hopelessness: Optimistic  Speech:  Normal  Thought Process:  Goal directed and Linear  Thought Content:  Normal and Mood congruent  Suicidal:  None  Homicidal:  None  Hallucinations:  None  Delusion:  None  Memory:  Intact  Orientation:  Person, Place, Time and Situation  Reliability:   good  Insight:  Good  Judgement:  Good  Impulse Control:  Good      Lab Results:   No visits with results within 1 Month(s) from this visit.   Latest known visit with results is:   Office Visit on 10/26/2021   Component Date Value Ref Range Status   • External Amphetamine Screen Urine 10/26/2021 Negative   Final   • Amphetamine Cut-Off 10/26/2021 1000ng/ml   Final   • External Benzodiazepine Screen Uri* 10/26/2021 Negative   Final   • Benzodiazipine Cut-Off 10/26/2021 300ng/ml   Final   • External Cocaine Screen Urine 10/26/2021 Negative   Final   • Cocaine Cut-Off 10/26/2021 300ng/ml   Final   • External THC Screen Urine 10/26/2021 Negative   Final   • THC Cut-Off 10/26/2021 50ng/ml   Final   • External Methadone Screen Urine 10/26/2021 Negative   Final   • Methadone Cut-Off 10/26/2021 300ng/ml   Final   • External Methamphetamine Screen Ur* 10/26/2021 Negative   Final   • Methamphetamine Cut-Off 10/26/2021 1000ng/ml   Final   • External Oxycodone Screen Urine 10/26/2021 Negative   Final   • Oxycodone Cut-Off 10/26/2021 100ng/ml   Final   • External Buprenorphine Screen Urine 10/26/2021 Negative   Final   • Buprenorphine Cut-Off 10/26/2021 10ng/ml   Final   • External MDMA 10/26/2021 Negative   Final   • MDMA Cut-Off 10/26/2021 500ng/ml   Final   • External Opiates Screen Urine 10/26/2021 Negative   Final   • Opiates Cut-Off 10/26/2021 300ng/ml   Final       Assessment/Plan   Diagnoses and all orders for this visit:    1. Recurrent major depressive disorder, in partial remission (HCC) (Primary)    2. Obsessive-compulsive disorder, unspecified type    3. Insomnia, unspecified type    -Patient has been doing very well.  She reports that with medication adjustments made, her anxiety is all but resolved and OCD symptoms are minimal.  She is currently doing well.  -Reviewed previous available documentation  -Reviewed most recent available labs   -JARRETT reviewed and appropriate. Patient counseled on use of controlled  substances.   -Continue Effexor XR 37.5 mg p.o. daily for mood, anxiety, OCD: Plan was to taper off of this medication but she is doing well on low dose with the addition of clomipramine, could consider discontinuing and increasing clomipramine in the future  -Continue trazodone 50 mg p.o. nightly as needed for insomnia  -Continue Ativan 0.5 mg 3 times daily as needed for anxiety and OCD  -Continue clonidine 0.1 mg nightly as needed for insomnia  -Clomipramine has been changed to 25 mg p.o. twice daily for anxiety, OCD, and mood as well as insomnia and will be continued today  -Approximate appointment time 2:15 PM to 2:40 PM via video visit    Visit Diagnoses:    ICD-10-CM ICD-9-CM   1. Recurrent major depressive disorder, in partial remission (HCC)  F33.41 296.35   2. Obsessive-compulsive disorder, unspecified type  F42.9 300.3   3. Insomnia, unspecified type  G47.00 780.52       TREATMENT PLAN - SHORT AND LONG-TERM GOALS: Continue supportive psychotherapy efforts and medications as indicated. Treatment and medication options discussed during today's visit. Patient acknowledged and verbally consented to continue with current treatment plan and was educated on the importance of compliance with treatment and follow-up appointments.    MEDICATION ISSUES:    Discussed medication options and treatment plan of prescribed medication as well as the risks, benefits, and side effects including potential falls, possible impaired driving and metabolic adversities among others. Patient is agreeable to call the office with any worsening of symptoms or onset of side effects. Patient is agreeable to call 911 or go to the nearest ER should he/she begin having SI/HI.     MEDS ORDERED DURING VISIT:  No orders of the defined types were placed in this encounter.      FOLLOW UP:  Return in about 3 months (around 7/12/2022).             This document has been electronically signed by Elvin Cruz MD  April 12, 2022 14:27  EDT    Dictated using Dragon Dictation.

## 2022-05-23 ENCOUNTER — TELEPHONE (OUTPATIENT)
Dept: PSYCHIATRY | Facility: CLINIC | Age: 62
End: 2022-05-23

## 2022-05-23 NOTE — TELEPHONE ENCOUNTER
Last three weeks she is feeling an increase in depression and anxiety side effects.  Stated that she felt like she was going down the spiral. Needs you to advise.

## 2022-05-24 NOTE — TELEPHONE ENCOUNTER
We could either increase her clomipramine or her Effexor.  We had been transitioning Effexor to clomipramine so we can continue to do so and increase clomipramine and leave the Effexor alone, or we could consider increasing Effexor back up and leaving clomipramine alone.

## 2022-05-24 NOTE — TELEPHONE ENCOUNTER
Pt is worried that meds are building up in system.  She is actually wanting to decrease something.  Wants to know if she can take 50 mg of clomipramine one then take 25 the next day so she is lowering it but not shocking her system.

## 2022-05-24 NOTE — TELEPHONE ENCOUNTER
That would be fine, if depression continues, we may need to think about going up more. We could also get rid of either Effexor or Clomipramine and increase the other.

## 2022-05-31 DIAGNOSIS — F33.0 MILD EPISODE OF RECURRENT MAJOR DEPRESSIVE DISORDER: ICD-10-CM

## 2022-05-31 DIAGNOSIS — F42.9 OBSESSIVE-COMPULSIVE DISORDER, UNSPECIFIED TYPE: ICD-10-CM

## 2022-05-31 DIAGNOSIS — G47.00 INSOMNIA, UNSPECIFIED TYPE: ICD-10-CM

## 2022-05-31 RX ORDER — CLOMIPRAMINE HYDROCHLORIDE 25 MG/1
50 CAPSULE ORAL NIGHTLY
Qty: 60 CAPSULE | Refills: 2 | Status: SHIPPED | OUTPATIENT
Start: 2022-05-31 | End: 2022-06-23 | Stop reason: SDUPTHER

## 2022-06-23 ENCOUNTER — TELEMEDICINE (OUTPATIENT)
Dept: PSYCHIATRY | Facility: CLINIC | Age: 62
End: 2022-06-23

## 2022-06-23 DIAGNOSIS — F42.9 OBSESSIVE-COMPULSIVE DISORDER, UNSPECIFIED TYPE: ICD-10-CM

## 2022-06-23 DIAGNOSIS — F33.41 RECURRENT MAJOR DEPRESSIVE DISORDER, IN PARTIAL REMISSION: ICD-10-CM

## 2022-06-23 DIAGNOSIS — G47.00 INSOMNIA, UNSPECIFIED TYPE: ICD-10-CM

## 2022-06-23 PROCEDURE — 99214 OFFICE O/P EST MOD 30 MIN: CPT | Performed by: PSYCHIATRY & NEUROLOGY

## 2022-06-23 RX ORDER — CLOMIPRAMINE HYDROCHLORIDE 25 MG/1
50 CAPSULE ORAL NIGHTLY
Qty: 60 CAPSULE | Refills: 2 | Status: SHIPPED | OUTPATIENT
Start: 2022-06-23 | End: 2022-09-08 | Stop reason: SDUPTHER

## 2022-06-23 NOTE — PROGRESS NOTES
Subjective   Mariaa Abraham is a 62 y.o. female who presents today for follow up    Chief Complaint:  Depression and Anxiety     This provider is located at [facility], [address] [place] [address]. The Patient is seen remotely at home, using Epic Mychart. Patient is being seen via telehealth and stated they are in a secure environment for this session. The patient’s condition being diagnosed/treated is appropriate for telemedicine. The provider identified himself as well as his credentials.   The patient gave consent to be seen remotely, and when consent is given they understand that the consent allows for patient identifiable information to be sent to a third party as needed.   They may refuse to be seen remotely at any time. The electronic data is encrypted and password protected, and the patient has been advised of the potential risks to privacy not withstanding such measures      History of Present Illness: Patient presenting today for follow-up.  Since last visit, she was doing relatively well in the month of April without any major symptoms but in May her anxiety started to increase and she started having longer duration of anxiety and OCD episodes.  We had discussed treatment options over the phone and patient reduced her clomipramine to 25 mg daily.  She started feeling better after a few weeks and has now been taking 50 mg daily alternating every other day with 25 mg daily and this seems to have improved her symptom burden.  She is currently not having any medication side effects.  Mood and anxiety symptoms are low and none problematic.  She recently went to the [place].  She denies SI/HI/AVH.    The following portions of the patient's history were reviewed and updated as appropriate: allergies, current medications, past family history, past medical history, past social history, past surgical history and problem list.      Past Medical History:  Past Medical History:   Diagnosis Date   • Heart  murmur    • History of bilateral breast reduction surgery    • History of surgery on right wrist    • Hyperlipidemia    • Mitral valve prolapse        Social History:  Social History     Socioeconomic History   • Marital status:    Tobacco Use   • Smoking status: Former Smoker     Years: 5.00     Types: Cigarettes     Quit date:      Years since quittin.5   • Smokeless tobacco: Never Used   Vaping Use   • Vaping Use: Never used   Substance and Sexual Activity   • Alcohol use: Yes     Alcohol/week: 7.0 standard drinks     Types: 7 Glasses of wine per week   • Drug use: No       Family History:  Family History   Problem Relation Age of Onset   • Heart attack Mother          at 69 with MI   • Heart disease Mother         CABG at 62, Valve replaced at 69 after her MI   • Hypertension Mother    • Hyperlipidemia Mother    • Diabetes Mother    • Alzheimer's disease Father    • Hyperlipidemia Brother    • Heart attack Maternal Grandfather          with MI at 62       Past Surgical History:  Past Surgical History:   Procedure Laterality Date   • CONVERTED (HISTORICAL) HOLTER  2019    10 days. AVG 76. . Two runs of SVT. Longest- 9 beats.   • ECHO - CONVERTED  2019    EF 65%. MVP. Mild MR.       Problem List:  Patient Active Problem List   Diagnosis   • Palpitations   • Murmur, cardiac   • Hypercholesteremia   • Dizziness   • Anxiety   • Depression       Allergy:   Allergies   Allergen Reactions   • Bactrim [Sulfamethoxazole-Trimethoprim] Rash   • Sulfa Antibiotics Rash        Current Medications:   Current Outpatient Medications   Medication Sig Dispense Refill   • cholecalciferol (VITAMIN D3) 25 MCG (1000 UT) tablet Take 1,000 Units by mouth Daily.     • clomiPRAMINE (ANAFRANIL) 25 MG capsule Take 2 capsules by mouth Every Night. 60 capsule 2   • cloNIDine (CATAPRES) 0.1 MG tablet Take 0.1 mg by mouth At Night As Needed for High Blood Pressure. Take 1/2 to 1 tablet nightly as needed      • LORazepam (ATIVAN) 0.5 MG tablet Take 0.5 mg by mouth 2 (Two) Times a Day As Needed for Anxiety.     • multivitamin with minerals (MULTIVITAMIN ADULT PO) Take 1 tablet by mouth Daily.     • rosuvastatin (CRESTOR) 10 MG tablet Take 10 mg by mouth Daily.     • traZODone (DESYREL) 50 MG tablet Take 50 mg by mouth At Night As Needed for Sleep.     • venlafaxine XR (Effexor XR) 37.5 MG 24 hr capsule Take 1 capsule by mouth Daily. 30 capsule 2     No current facility-administered medications for this visit.       Review of Symptoms:    Review of Systems   Constitutional: Negative for activity change, diaphoresis and fatigue.   HENT: Negative for congestion and sneezing.    Eyes: Negative for blurred vision and visual disturbance.   Respiratory: Negative for cough and shortness of breath.    Cardiovascular: Negative for chest pain and palpitations.   Gastrointestinal: Negative for nausea and vomiting.   Endocrine: Negative for cold intolerance and heat intolerance.   Genitourinary: Negative for dysuria and frequency.   Musculoskeletal: Negative for arthralgias and myalgias.   Skin: Negative for rash and wound.   Allergic/Immunologic: Negative for environmental allergies and food allergies.   Neurological: Negative for weakness and confusion.   Hematological: Negative for adenopathy. Does not bruise/bleed easily.   Psychiatric/Behavioral: Negative for dysphoric mood, sleep disturbance and stress. The patient is not nervous/anxious.          Physical Exam:   There were no vitals taken for this visit.  Video visit    Appearance: CF of stated age, well dressed, NAD   Gait, Station, Strength: Video visit    Mental Status Exam:     Mental Status exam performed 06/23/2022  and patient shows no significant changes from previous exam.     Hygiene:   good  Cooperation:  Cooperative  Eye Contact:  Good  Psychomotor Behavior:  Appropriate  Affect:  Full range  Mood: normal, improved    Hopelessness: Optimistic  Speech:   Normal  Thought Process:  Goal directed and Linear  Thought Content:  Normal and Mood congruent  Suicidal:  None  Homicidal:  None  Hallucinations:  None  Delusion:  None  Memory:  Intact  Orientation:  Person, Place, Time and Situation  Reliability:  good  Insight:  Good  Judgement:  Good  Impulse Control:  Good      Lab Results:   No visits with results within 1 Month(s) from this visit.   Latest known visit with results is:   Office Visit on 10/26/2021   Component Date Value Ref Range Status   • External Amphetamine Screen Urine 10/26/2021 Negative   Final   • Amphetamine Cut-Off 10/26/2021 1000ng/ml   Final   • External Benzodiazepine Screen Uri* 10/26/2021 Negative   Final   • Benzodiazipine Cut-Off 10/26/2021 300ng/ml   Final   • External Cocaine Screen Urine 10/26/2021 Negative   Final   • Cocaine Cut-Off 10/26/2021 300ng/ml   Final   • External THC Screen Urine 10/26/2021 Negative   Final   • THC Cut-Off 10/26/2021 50ng/ml   Final   • External Methadone Screen Urine 10/26/2021 Negative   Final   • Methadone Cut-Off 10/26/2021 300ng/ml   Final   • External Methamphetamine Screen Ur* 10/26/2021 Negative   Final   • Methamphetamine Cut-Off 10/26/2021 1000ng/ml   Final   • External Oxycodone Screen Urine 10/26/2021 Negative   Final   • Oxycodone Cut-Off 10/26/2021 100ng/ml   Final   • External Buprenorphine Screen Urine 10/26/2021 Negative   Final   • Buprenorphine Cut-Off 10/26/2021 10ng/ml   Final   • External MDMA 10/26/2021 Negative   Final   • MDMA Cut-Off 10/26/2021 500ng/ml   Final   • External Opiates Screen Urine 10/26/2021 Negative   Final   • Opiates Cut-Off 10/26/2021 300ng/ml   Final       Assessment & Plan   Diagnoses and all orders for this visit:    1. Recurrent major depressive disorder, in partial remission (HCC)  -     clomiPRAMINE (ANAFRANIL) 25 MG capsule; Take 2 capsules by mouth Every Night.  Dispense: 60 capsule; Refill: 2    2. Obsessive-compulsive disorder, unspecified type  -      clomiPRAMINE (ANAFRANIL) 25 MG capsule; Take 2 capsules by mouth Every Night.  Dispense: 60 capsule; Refill: 2    3. Insomnia, unspecified type  -     clomiPRAMINE (ANAFRANIL) 25 MG capsule; Take 2 capsules by mouth Every Night.  Dispense: 60 capsule; Refill: 2    -Patient had some increase in anxiety and OCD symptoms since last visit and we adjusted dosing of Clomipramine to lower dosing and she is currently alternating days with 50mg and 25mg. This has improved symptoms and she is currently stable at baseline without any issues.   -Reviewed previous available documentation  -Reviewed most recent available labs   -JARRETT reviewed and appropriate. Patient counseled on use of controlled substances.   -Continue Effexor XR 37.5 mg p.o. daily for mood, anxiety, OCD: Plan was to taper off of this medication but she is doing well on low dose with the addition of clomipramine, could consider discontinuing and increasing clomipramine in the future  -Continue trazodone 50 mg p.o. nightly as needed for insomnia  -Continue Ativan 0.5 mg 3 times daily as needed for anxiety and OCD  -Continue clonidine 0.1 mg nightly as needed for insomnia  -Clomipramine has been changed to 25 mg p.o. daily alternating every other day with 50 mg for anxiety, OCD, and mood as well as insomnia   -Approximate appointment time 9:25 AM to 9:55 AM via video visit    Visit Diagnoses:    ICD-10-CM ICD-9-CM   1. Recurrent major depressive disorder, in partial remission (HCC)  F33.41 296.35   2. Obsessive-compulsive disorder, unspecified type  F42.9 300.3   3. Insomnia, unspecified type  G47.00 780.52       TREATMENT PLAN - SHORT AND LONG-TERM GOALS: Continue supportive psychotherapy efforts and medications as indicated. Treatment and medication options discussed during today's visit. Patient acknowledged and verbally consented to continue with current treatment plan and was educated on the importance of compliance with treatment and follow-up  appointments.    MEDICATION ISSUES:    Discussed medication options and treatment plan of prescribed medication as well as the risks, benefits, and side effects including potential falls, possible impaired driving and metabolic adversities among others. Patient is agreeable to call the office with any worsening of symptoms or onset of side effects. Patient is agreeable to call 911 or go to the nearest ER should he/she begin having SI/HI.     MEDS ORDERED DURING VISIT:  New Medications Ordered This Visit   Medications   • clomiPRAMINE (ANAFRANIL) 25 MG capsule     Sig: Take 2 capsules by mouth Every Night.     Dispense:  60 capsule     Refill:  2       FOLLOW UP:  Return in about 3 months (around 9/23/2022).             This document has been electronically signed by Elvin Cruz MD  June 23, 2022 09:45 EDT    Dictated using Dragon Dictation.

## 2022-09-08 ENCOUNTER — TELEMEDICINE (OUTPATIENT)
Dept: PSYCHIATRY | Facility: CLINIC | Age: 62
End: 2022-09-08

## 2022-09-08 DIAGNOSIS — F42.9 OBSESSIVE-COMPULSIVE DISORDER, UNSPECIFIED TYPE: ICD-10-CM

## 2022-09-08 DIAGNOSIS — G47.00 INSOMNIA, UNSPECIFIED TYPE: ICD-10-CM

## 2022-09-08 DIAGNOSIS — F33.0 MILD EPISODE OF RECURRENT MAJOR DEPRESSIVE DISORDER: Primary | ICD-10-CM

## 2022-09-08 PROCEDURE — 99214 OFFICE O/P EST MOD 30 MIN: CPT | Performed by: PSYCHIATRY & NEUROLOGY

## 2022-09-08 RX ORDER — CLOMIPRAMINE HYDROCHLORIDE 25 MG/1
50 CAPSULE ORAL NIGHTLY
Qty: 60 CAPSULE | Refills: 2 | Status: SHIPPED | OUTPATIENT
Start: 2022-09-08 | End: 2022-11-09 | Stop reason: SDUPTHER

## 2022-09-08 NOTE — PROGRESS NOTES
"Subjective   Mariaa Abraham is a 62 y.o. female who presents today for follow up    Chief Complaint:  Depression and Anxiety     This provider is located at [facility], [address] [place] [address]. The Patient is seen remotely at home, using Epic Mychart. Patient is being seen via telehealth and stated they are in a secure environment for this session. The patient’s condition being diagnosed/treated is appropriate for telemedicine. The provider identified himself as well as his credentials.   The patient gave consent to be seen remotely, and when consent is given they understand that the consent allows for patient identifiable information to be sent to a third party as needed.   They may refuse to be seen remotely at any time. The electronic data is encrypted and password protected, and the patient has been advised of the potential risks to privacy not withstanding such measures      History of Present Illness: Patient presented today for follow-up.  She reports that since last visit, she did do very well in July and felt like she was not having any major symptom burden.  In August, she felt that her mood reduced somewhat and she was doing, \"okay.\"  Her sleep has also worsened in the past month.  We discussed treatment options and it appears that clomipramine was too intense initially but as patient acclimated to the medication, the efficacy was reduced.  I feel that this is a good sign and it is likely that clomipramine will be helpful again since she did so well after initiating the medication to try to maximize benefit from current regimen.  She denies any current medication side effects.  She denies SI/HI/AVH.    The following portions of the patient's history were reviewed and updated as appropriate: allergies, current medications, past family history, past medical history, past social history, past surgical history and problem list.      Past Medical History:  Past Medical History:   Diagnosis " Date   • Heart murmur    • History of bilateral breast reduction surgery    • History of surgery on right wrist    • Hyperlipidemia    • Mitral valve prolapse        Social History:  Social History     Socioeconomic History   • Marital status:    Tobacco Use   • Smoking status: Former Smoker     Years: 5.00     Types: Cigarettes     Quit date:      Years since quittin.7   • Smokeless tobacco: Never Used   Vaping Use   • Vaping Use: Never used   Substance and Sexual Activity   • Alcohol use: Yes     Alcohol/week: 7.0 standard drinks     Types: 7 Glasses of wine per week   • Drug use: No       Family History:  Family History   Problem Relation Age of Onset   • Heart attack Mother          at 69 with MI   • Heart disease Mother         CABG at 62, Valve replaced at 69 after her MI   • Hypertension Mother    • Hyperlipidemia Mother    • Diabetes Mother    • Alzheimer's disease Father    • Hyperlipidemia Brother    • Heart attack Maternal Grandfather          with MI at 62       Past Surgical History:  Past Surgical History:   Procedure Laterality Date   • CONVERTED (HISTORICAL) HOLTER  2019    10 days. AVG 76. . Two runs of SVT. Longest- 9 beats.   • ECHO - CONVERTED  2019    EF 65%. MVP. Mild MR.       Problem List:  Patient Active Problem List   Diagnosis   • Palpitations   • Murmur, cardiac   • Hypercholesteremia   • Dizziness   • Anxiety   • Depression       Allergy:   Allergies   Allergen Reactions   • Bactrim [Sulfamethoxazole-Trimethoprim] Rash   • Sulfa Antibiotics Rash        Current Medications:   Current Outpatient Medications   Medication Sig Dispense Refill   • cholecalciferol (VITAMIN D3) 25 MCG (1000 UT) tablet Take 1,000 Units by mouth Daily.     • clomiPRAMINE (ANAFRANIL) 25 MG capsule Take 2 capsules by mouth Every Night. 60 capsule 2   • cloNIDine (CATAPRES) 0.1 MG tablet Take 0.1 mg by mouth At Night As Needed for High Blood Pressure. Take 1/2 to 1 tablet  nightly as needed     • LORazepam (ATIVAN) 0.5 MG tablet Take 0.5 mg by mouth 2 (Two) Times a Day As Needed for Anxiety.     • multivitamin with minerals (MULTIVITAMIN ADULT PO) Take 1 tablet by mouth Daily.     • rosuvastatin (CRESTOR) 10 MG tablet Take 10 mg by mouth Daily.     • traZODone (DESYREL) 50 MG tablet Take 50 mg by mouth At Night As Needed for Sleep.     • venlafaxine XR (Effexor XR) 37.5 MG 24 hr capsule Take 1 capsule by mouth Daily. 30 capsule 2     No current facility-administered medications for this visit.       Review of Symptoms:    Review of Systems   Constitutional: Negative for activity change, diaphoresis and fatigue.   HENT: Negative for congestion and sneezing.    Eyes: Negative for blurred vision and visual disturbance.   Respiratory: Negative for cough and shortness of breath.    Cardiovascular: Negative for chest pain and palpitations.   Gastrointestinal: Negative for nausea and vomiting.   Endocrine: Negative for cold intolerance and heat intolerance.   Genitourinary: Negative for dysuria and frequency.   Musculoskeletal: Negative for arthralgias and myalgias.   Skin: Negative for rash and wound.   Allergic/Immunologic: Negative for environmental allergies and food allergies.   Neurological: Negative for weakness and confusion.   Hematological: Negative for adenopathy. Does not bruise/bleed easily.   Psychiatric/Behavioral: Positive for dysphoric mood and sleep disturbance. Negative for stress. The patient is nervous/anxious.          Physical Exam:   There were no vitals taken for this visit.  Video visit    Appearance: CF of stated age, NAD   Gait, Station, Strength: Video visit    Mental Status Exam:       Hygiene:   good  Cooperation:  Cooperative  Eye Contact:  Good  Psychomotor Behavior:  Appropriate  Affect:  Full range  Mood: dysphoric, mild  Hopelessness: Optimistic  Speech:  Normal  Thought Process:  Goal directed and Linear  Thought Content:  Normal and Mood  congruent  Suicidal:  None  Homicidal:  None  Hallucinations:  None  Delusion:  None  Memory:  Intact  Orientation:  Person, Place, Time and Situation  Reliability:  good  Insight:  Good  Judgement:  Good  Impulse Control:  Good      Lab Results:   No visits with results within 1 Month(s) from this visit.   Latest known visit with results is:   Office Visit on 10/26/2021   Component Date Value Ref Range Status   • External Amphetamine Screen Urine 10/26/2021 Negative   Final   • Amphetamine Cut-Off 10/26/2021 1000ng/ml   Final   • External Benzodiazepine Screen Uri* 10/26/2021 Negative   Final   • Benzodiazipine Cut-Off 10/26/2021 300ng/ml   Final   • External Cocaine Screen Urine 10/26/2021 Negative   Final   • Cocaine Cut-Off 10/26/2021 300ng/ml   Final   • External THC Screen Urine 10/26/2021 Negative   Final   • THC Cut-Off 10/26/2021 50ng/ml   Final   • External Methadone Screen Urine 10/26/2021 Negative   Final   • Methadone Cut-Off 10/26/2021 300ng/ml   Final   • External Methamphetamine Screen Ur* 10/26/2021 Negative   Final   • Methamphetamine Cut-Off 10/26/2021 1000ng/ml   Final   • External Oxycodone Screen Urine 10/26/2021 Negative   Final   • Oxycodone Cut-Off 10/26/2021 100ng/ml   Final   • External Buprenorphine Screen Urine 10/26/2021 Negative   Final   • Buprenorphine Cut-Off 10/26/2021 10ng/ml   Final   • External MDMA 10/26/2021 Negative   Final   • MDMA Cut-Off 10/26/2021 500ng/ml   Final   • External Opiates Screen Urine 10/26/2021 Negative   Final   • Opiates Cut-Off 10/26/2021 300ng/ml   Final       Assessment & Plan   Diagnoses and all orders for this visit:    1. Mild episode of recurrent major depressive disorder (HCC) (Primary)  -     clomiPRAMINE (ANAFRANIL) 25 MG capsule; Take 2 capsules by mouth Every Night.  Dispense: 60 capsule; Refill: 2    2. Obsessive-compulsive disorder, unspecified type  -     clomiPRAMINE (ANAFRANIL) 25 MG capsule; Take 2 capsules by mouth Every Night.   Dispense: 60 capsule; Refill: 2    3. Insomnia, unspecified type  -     clomiPRAMINE (ANAFRANIL) 25 MG capsule; Take 2 capsules by mouth Every Night.  Dispense: 60 capsule; Refill: 2    -Patient initially was doing very well with the clomipramine but reports that in the last month, dysphoria, anxiety, and intrusive thoughts have come back.  She also reports sleep has been somewhat worse.  I feel that medication was helping initially when she is naïve to the medication and it was likely more efficacious due to this, so I feel that by maximizing benefit, we can get improvement again.  -Reviewed previous available documentation  -Reviewed most recent available labs   -JARRETT reviewed and appropriate. Patient counseled on use of controlled substances.   -Continue Effexor XR 37.5 mg p.o. daily for mood, anxiety, OCD: Plan was to taper off of this medication but she is doing well on low dose with the addition of clomipramine, could consider discontinuing and increasing clomipramine in the future  -Continue trazodone 50 mg p.o. nightly as needed for insomnia  -Continue Ativan 0.5 mg 3 times daily as needed for anxiety and OCD  -Continue clonidine 0.1 mg nightly as needed for insomnia  -Increase clomipramine to 50 mg daily for anxiety, OCD, and mood as well as insomnia   -Approximate appointment time 10 AM to 10:20 AM via video visit    Visit Diagnoses:    ICD-10-CM ICD-9-CM   1. Mild episode of recurrent major depressive disorder (HCC)  F33.0 296.31   2. Obsessive-compulsive disorder, unspecified type  F42.9 300.3   3. Insomnia, unspecified type  G47.00 780.52       TREATMENT PLAN - SHORT AND LONG-TERM GOALS: Continue supportive psychotherapy efforts and medications as indicated. Treatment and medication options discussed during today's visit. Patient acknowledged and verbally consented to continue with current treatment plan and was educated on the importance of compliance with treatment and follow-up  appointments.    MEDICATION ISSUES:    Discussed medication options and treatment plan of prescribed medication as well as the risks, benefits, and side effects including potential falls, possible impaired driving and metabolic adversities among others. Patient is agreeable to call the office with any worsening of symptoms or onset of side effects. Patient is agreeable to call 911 or go to the nearest ER should he/she begin having SI/HI.     MEDS ORDERED DURING VISIT:  New Medications Ordered This Visit   Medications   • clomiPRAMINE (ANAFRANIL) 25 MG capsule     Sig: Take 2 capsules by mouth Every Night.     Dispense:  60 capsule     Refill:  2       FOLLOW UP:  Return in about 4 weeks (around 10/6/2022).             This document has been electronically signed by Elvin Cruz MD  September 8, 2022 10:12 EDT    Dictated using Dragon Dictation.

## 2022-10-12 DIAGNOSIS — F33.0 MILD EPISODE OF RECURRENT MAJOR DEPRESSIVE DISORDER: ICD-10-CM

## 2022-10-12 DIAGNOSIS — F42.9 OBSESSIVE-COMPULSIVE DISORDER, UNSPECIFIED TYPE: ICD-10-CM

## 2022-10-12 RX ORDER — VENLAFAXINE HYDROCHLORIDE 37.5 MG/1
37.5 CAPSULE, EXTENDED RELEASE ORAL DAILY
Qty: 30 CAPSULE | Refills: 2 | Status: SHIPPED | OUTPATIENT
Start: 2022-10-12 | End: 2023-01-10

## 2022-11-09 ENCOUNTER — TELEMEDICINE (OUTPATIENT)
Dept: PSYCHIATRY | Facility: CLINIC | Age: 62
End: 2022-11-09

## 2022-11-09 DIAGNOSIS — F42.9 OBSESSIVE-COMPULSIVE DISORDER, UNSPECIFIED TYPE: Primary | ICD-10-CM

## 2022-11-09 DIAGNOSIS — F33.0 MILD EPISODE OF RECURRENT MAJOR DEPRESSIVE DISORDER: ICD-10-CM

## 2022-11-09 DIAGNOSIS — G47.00 INSOMNIA, UNSPECIFIED TYPE: ICD-10-CM

## 2022-11-09 PROCEDURE — 99214 OFFICE O/P EST MOD 30 MIN: CPT | Performed by: PSYCHIATRY & NEUROLOGY

## 2022-11-09 RX ORDER — CLOMIPRAMINE HYDROCHLORIDE 25 MG/1
50 CAPSULE ORAL NIGHTLY
Qty: 60 CAPSULE | Refills: 2 | Status: SHIPPED | OUTPATIENT
Start: 2022-11-09 | End: 2023-03-29 | Stop reason: SDUPTHER

## 2022-11-09 RX ORDER — CLOMIPRAMINE HYDROCHLORIDE 25 MG/1
50 CAPSULE ORAL NIGHTLY
Qty: 60 CAPSULE | Refills: 2 | Status: SHIPPED | OUTPATIENT
Start: 2022-11-09 | End: 2022-11-09 | Stop reason: SDUPTHER

## 2022-11-09 NOTE — PROGRESS NOTES
Subjective   Mariaa Abraham is a 62 y.o. female who presents today for follow up    Chief Complaint:  Depression and Anxiety     This provider is located at [facility], [address] [place] [address]. The Patient is seen remotely at home, using Epic Mychart. Patient is being seen via telehealth and stated they are in a secure environment for this session. The patient’s condition being diagnosed/treated is appropriate for telemedicine. The provider identified himself as well as his credentials.   The patient gave consent to be seen remotely, and when consent is given they understand that the consent allows for patient identifiable information to be sent to a third party as needed.   They may refuse to be seen remotely at any time. The electronic data is encrypted and password protected, and the patient has been advised of the potential risks to privacy not withstanding such measures      History of Present Illness: Patient presented today for follow-up.  She reports that since last visit, she has been doing relatively well.  She could not tolerate 50 mg of clomipramine daily but she states that she has been taking it every other day and reducing dose to 25 mg in between which does seem to control symptoms well and she does see improvement in mood and anxiety symptoms.  She is not having any major symptom burden at this time.  She denies SI/HI/AVH.  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      The following portions of the patient's history were reviewed and updated as appropriate: allergies, current medications, past family history, past medical history, past social history, past surgical history and problem list.      Past Medical History:  Past Medical History:   Diagnosis Date   • Heart murmur    • History of bilateral breast reduction surgery    • History of surgery on right wrist    • Hyperlipidemia    • Mitral valve prolapse        Social  History:  Social History     Socioeconomic History   • Marital status:    Tobacco Use   • Smoking status: Former     Years: 5.00     Types: Cigarettes     Quit date:      Years since quittin.8   • Smokeless tobacco: Never   Vaping Use   • Vaping Use: Never used   Substance and Sexual Activity   • Alcohol use: Yes     Alcohol/week: 7.0 standard drinks     Types: 7 Glasses of wine per week   • Drug use: No       Family History:  Family History   Problem Relation Age of Onset   • Heart attack Mother          at 69 with MI   • Heart disease Mother         CABG at 62, Valve replaced at 69 after her MI   • Hypertension Mother    • Hyperlipidemia Mother    • Diabetes Mother    • Alzheimer's disease Father    • Hyperlipidemia Brother    • Heart attack Maternal Grandfather          with MI at 62       Past Surgical History:  Past Surgical History:   Procedure Laterality Date   • CONVERTED (HISTORICAL) HOLTER  2019    10 days. AVG 76. . Two runs of SVT. Longest- 9 beats.   • ECHO - CONVERTED  2019    EF 65%. MVP. Mild MR.       Problem List:  Patient Active Problem List   Diagnosis   • Palpitations   • Murmur, cardiac   • Hypercholesteremia   • Dizziness   • Anxiety   • Depression       Allergy:   Allergies   Allergen Reactions   • Bactrim [Sulfamethoxazole-Trimethoprim] Rash   • Sulfa Antibiotics Rash        Current Medications:   Current Outpatient Medications   Medication Sig Dispense Refill   • venlafaxine XR (Effexor XR) 37.5 MG 24 hr capsule Take 1 capsule by mouth Daily. 30 capsule 2   • cholecalciferol (VITAMIN D3) 25 MCG (1000 UT) tablet Take 1,000 Units by mouth Daily.     • clomiPRAMINE (ANAFRANIL) 25 MG capsule Take 2 capsules by mouth Every Night. 60 capsule 2   • cloNIDine (CATAPRES) 0.1 MG tablet Take 0.1 mg by mouth At Night As Needed for High Blood Pressure. Take 1/2 to 1 tablet nightly as needed     • LORazepam (ATIVAN) 0.5 MG tablet Take 0.5 mg by mouth 2 (Two)  Times a Day As Needed for Anxiety.     • multivitamin with minerals (MULTIVITAMIN ADULT PO) Take 1 tablet by mouth Daily.     • rosuvastatin (CRESTOR) 10 MG tablet Take 10 mg by mouth Daily.     • traZODone (DESYREL) 50 MG tablet Take 50 mg by mouth At Night As Needed for Sleep.       No current facility-administered medications for this visit.       Review of Symptoms:    Review of Systems   Constitutional: Negative for activity change, diaphoresis and fatigue.   HENT: Negative for congestion and sneezing.    Eyes: Negative for blurred vision and visual disturbance.   Respiratory: Negative for cough and shortness of breath.    Cardiovascular: Negative for chest pain and palpitations.   Gastrointestinal: Negative for nausea and vomiting.   Endocrine: Negative for cold intolerance and heat intolerance.   Genitourinary: Negative for dysuria and frequency.   Musculoskeletal: Negative for arthralgias and myalgias.   Skin: Negative for rash and wound.   Allergic/Immunologic: Negative for environmental allergies and food allergies.   Neurological: Negative for weakness and confusion.   Hematological: Negative for adenopathy. Does not bruise/bleed easily.   Psychiatric/Behavioral: Positive for dysphoric mood and sleep disturbance. Negative for stress. The patient is nervous/anxious.          Physical Exam:   There were no vitals taken for this visit.  Video visit    Appearance: CF of stated age, NAD   Gait, Station, Strength: Video visit    Mental Status Exam:       Hygiene:   good  Cooperation:  Cooperative  Eye Contact:  Good  Psychomotor Behavior:  Appropriate  Affect:  Full range  Mood: normal  Hopelessness: Optimistic  Speech:  Normal  Thought Process:  Goal directed and Linear  Thought Content:  Normal and Mood congruent  Suicidal:  None  Homicidal:  None  Hallucinations:  None  Delusion:  None  Memory:  Intact  Orientation:  Person, Place, Time and Situation  Reliability:  good  Insight:  Good  Judgement:   Good  Impulse Control:  Good      Lab Results:   No visits with results within 1 Month(s) from this visit.   Latest known visit with results is:   Office Visit on 10/26/2021   Component Date Value Ref Range Status   • External Amphetamine Screen Urine 10/26/2021 Negative   Final   • Amphetamine Cut-Off 10/26/2021 1000ng/ml   Final   • External Benzodiazepine Screen Uri* 10/26/2021 Negative   Final   • Benzodiazipine Cut-Off 10/26/2021 300ng/ml   Final   • External Cocaine Screen Urine 10/26/2021 Negative   Final   • Cocaine Cut-Off 10/26/2021 300ng/ml   Final   • External THC Screen Urine 10/26/2021 Negative   Final   • THC Cut-Off 10/26/2021 50ng/ml   Final   • External Methadone Screen Urine 10/26/2021 Negative   Final   • Methadone Cut-Off 10/26/2021 300ng/ml   Final   • External Methamphetamine Screen Ur* 10/26/2021 Negative   Final   • Methamphetamine Cut-Off 10/26/2021 1000ng/ml   Final   • External Oxycodone Screen Urine 10/26/2021 Negative   Final   • Oxycodone Cut-Off 10/26/2021 100ng/ml   Final   • External Buprenorphine Screen Urine 10/26/2021 Negative   Final   • Buprenorphine Cut-Off 10/26/2021 10ng/ml   Final   • External MDMA 10/26/2021 Negative   Final   • MDMA Cut-Off 10/26/2021 500ng/ml   Final   • External Opiates Screen Urine 10/26/2021 Negative   Final   • Opiates Cut-Off 10/26/2021 300ng/ml   Final       Assessment & Plan   Diagnoses and all orders for this visit:    1. Obsessive-compulsive disorder, unspecified type (Primary)  -     Discontinue: clomiPRAMINE (ANAFRANIL) 25 MG capsule; Take 2 capsules by mouth Every Night.  Dispense: 60 capsule; Refill: 2  -     clomiPRAMINE (ANAFRANIL) 25 MG capsule; Take 2 capsules by mouth Every Night.  Dispense: 60 capsule; Refill: 2    2. Insomnia, unspecified type  -     Discontinue: clomiPRAMINE (ANAFRANIL) 25 MG capsule; Take 2 capsules by mouth Every Night.  Dispense: 60 capsule; Refill: 2  -     clomiPRAMINE (ANAFRANIL) 25 MG capsule; Take 2 capsules  by mouth Every Night.  Dispense: 60 capsule; Refill: 2    3. Mild episode of recurrent major depressive disorder (HCC)  -     Discontinue: clomiPRAMINE (ANAFRANIL) 25 MG capsule; Take 2 capsules by mouth Every Night.  Dispense: 60 capsule; Refill: 2  -     clomiPRAMINE (ANAFRANIL) 25 MG capsule; Take 2 capsules by mouth Every Night.  Dispense: 60 capsule; Refill: 2    -Patient doing well without any major symptom burden at this time  -Reviewed previous available documentation  -Reviewed most recent available labs   -JARRETT reviewed and appropriate. Patient counseled on use of controlled substances.   -Continue Effexor XR 37.5 mg p.o. daily for mood, anxiety, OCD: Plan was to taper off of this medication but she is doing well on low dose with the addition of clomipramine, could consider discontinuing and increasing clomipramine in the future  -Continue trazodone 50 mg p.o. nightly as needed for insomnia  -Continue Ativan 0.5 mg 3 times daily as needed for anxiety and OCD  -Continue clonidine 0.1 mg nightly as needed for insomnia  -Continue clomipramine 50 mg/25 mg alternating daily for anxiety, OCD, and mood as well as insomnia   -Approximate appointment time 11:15 AM to 11:40 AM via video visit    Visit Diagnoses:    ICD-10-CM ICD-9-CM   1. Obsessive-compulsive disorder, unspecified type  F42.9 300.3   2. Insomnia, unspecified type  G47.00 780.52   3. Mild episode of recurrent major depressive disorder (HCC)  F33.0 296.31       TREATMENT PLAN - SHORT AND LONG-TERM GOALS: Continue supportive psychotherapy efforts and medications as indicated. Treatment and medication options discussed during today's visit. Patient acknowledged and verbally consented to continue with current treatment plan and was educated on the importance of compliance with treatment and follow-up appointments.    MEDICATION ISSUES:    Discussed medication options and treatment plan of prescribed medication as well as the risks, benefits, and side  effects including potential falls, possible impaired driving and metabolic adversities among others. Patient is agreeable to call the office with any worsening of symptoms or onset of side effects. Patient is agreeable to call 911 or go to the nearest ER should he/she begin having SI/HI.     MEDS ORDERED DURING VISIT:  New Medications Ordered This Visit   Medications   • clomiPRAMINE (ANAFRANIL) 25 MG capsule     Sig: Take 2 capsules by mouth Every Night.     Dispense:  60 capsule     Refill:  2       FOLLOW UP:  Return in about 3 months (around 2/9/2023).             This document has been electronically signed by Elvin Cruz MD  November 9, 2022 11:27 EST    Dictated using Dragon Dictation.

## 2023-01-09 DIAGNOSIS — F33.0 MILD EPISODE OF RECURRENT MAJOR DEPRESSIVE DISORDER: ICD-10-CM

## 2023-01-09 DIAGNOSIS — F42.9 OBSESSIVE-COMPULSIVE DISORDER, UNSPECIFIED TYPE: ICD-10-CM

## 2023-01-10 RX ORDER — VENLAFAXINE HYDROCHLORIDE 37.5 MG/1
CAPSULE, EXTENDED RELEASE ORAL
Qty: 30 CAPSULE | Refills: 2 | Status: SHIPPED | OUTPATIENT
Start: 2023-01-10 | End: 2023-03-29 | Stop reason: SDUPTHER

## 2023-03-29 ENCOUNTER — TELEMEDICINE (OUTPATIENT)
Dept: PSYCHIATRY | Facility: CLINIC | Age: 63
End: 2023-03-29
Payer: COMMERCIAL

## 2023-03-29 DIAGNOSIS — F42.9 OBSESSIVE-COMPULSIVE DISORDER, UNSPECIFIED TYPE: ICD-10-CM

## 2023-03-29 DIAGNOSIS — G47.00 INSOMNIA, UNSPECIFIED TYPE: ICD-10-CM

## 2023-03-29 DIAGNOSIS — F33.0 MILD EPISODE OF RECURRENT MAJOR DEPRESSIVE DISORDER: ICD-10-CM

## 2023-03-29 PROCEDURE — 99214 OFFICE O/P EST MOD 30 MIN: CPT | Performed by: PSYCHIATRY & NEUROLOGY

## 2023-03-29 RX ORDER — CLOMIPRAMINE HYDROCHLORIDE 25 MG/1
50 CAPSULE ORAL NIGHTLY
Qty: 60 CAPSULE | Refills: 2 | Status: SHIPPED | OUTPATIENT
Start: 2023-03-29

## 2023-03-29 RX ORDER — CLONIDINE HYDROCHLORIDE 0.1 MG/1
0.1 TABLET ORAL NIGHTLY PRN
Qty: 30 TABLET | Refills: 2 | Status: SHIPPED | OUTPATIENT
Start: 2023-03-29

## 2023-03-29 RX ORDER — VENLAFAXINE HYDROCHLORIDE 37.5 MG/1
37.5 CAPSULE, EXTENDED RELEASE ORAL DAILY
Qty: 30 CAPSULE | Refills: 2 | Status: SHIPPED | OUTPATIENT
Start: 2023-03-29

## 2023-03-29 RX ORDER — TRAZODONE HYDROCHLORIDE 50 MG/1
50 TABLET ORAL NIGHTLY PRN
Qty: 30 TABLET | Refills: 2 | Status: SHIPPED | OUTPATIENT
Start: 2023-03-29
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
## 2023-06-08 DIAGNOSIS — F33.0 MILD EPISODE OF RECURRENT MAJOR DEPRESSIVE DISORDER: ICD-10-CM

## 2023-06-08 DIAGNOSIS — F42.9 OBSESSIVE-COMPULSIVE DISORDER, UNSPECIFIED TYPE: ICD-10-CM

## 2023-06-09 RX ORDER — VENLAFAXINE HYDROCHLORIDE 37.5 MG/1
CAPSULE, EXTENDED RELEASE ORAL
Qty: 30 CAPSULE | Refills: 2 | Status: SHIPPED | OUTPATIENT
Start: 2023-06-09

## 2023-07-25 DIAGNOSIS — F42.9 OBSESSIVE-COMPULSIVE DISORDER, UNSPECIFIED TYPE: ICD-10-CM

## 2023-07-25 DIAGNOSIS — G47.00 INSOMNIA, UNSPECIFIED TYPE: ICD-10-CM

## 2023-07-25 DIAGNOSIS — F33.0 MILD EPISODE OF RECURRENT MAJOR DEPRESSIVE DISORDER: ICD-10-CM

## 2023-07-26 RX ORDER — CLOMIPRAMINE HYDROCHLORIDE 25 MG/1
50 CAPSULE ORAL NIGHTLY
Qty: 60 CAPSULE | Refills: 2 | Status: SHIPPED | OUTPATIENT
Start: 2023-07-26

## 2023-09-06 DIAGNOSIS — F42.9 OBSESSIVE-COMPULSIVE DISORDER, UNSPECIFIED TYPE: ICD-10-CM

## 2023-09-06 DIAGNOSIS — F33.0 MILD EPISODE OF RECURRENT MAJOR DEPRESSIVE DISORDER: ICD-10-CM

## 2023-09-06 RX ORDER — VENLAFAXINE HYDROCHLORIDE 37.5 MG/1
CAPSULE, EXTENDED RELEASE ORAL
Qty: 30 CAPSULE | Refills: 2 | Status: SHIPPED | OUTPATIENT
Start: 2023-09-06

## 2023-11-21 DIAGNOSIS — F33.0 MILD EPISODE OF RECURRENT MAJOR DEPRESSIVE DISORDER: ICD-10-CM

## 2023-11-21 DIAGNOSIS — F42.9 OBSESSIVE-COMPULSIVE DISORDER, UNSPECIFIED TYPE: ICD-10-CM

## 2023-11-21 DIAGNOSIS — G47.00 INSOMNIA, UNSPECIFIED TYPE: ICD-10-CM

## 2023-11-22 RX ORDER — CLOMIPRAMINE HYDROCHLORIDE 25 MG/1
50 CAPSULE ORAL NIGHTLY
Qty: 60 CAPSULE | Refills: 2 | Status: SHIPPED | OUTPATIENT
Start: 2023-11-22

## 2023-12-04 DIAGNOSIS — F33.0 MILD EPISODE OF RECURRENT MAJOR DEPRESSIVE DISORDER: ICD-10-CM

## 2023-12-04 DIAGNOSIS — F42.9 OBSESSIVE-COMPULSIVE DISORDER, UNSPECIFIED TYPE: ICD-10-CM

## 2023-12-05 RX ORDER — VENLAFAXINE HYDROCHLORIDE 37.5 MG/1
CAPSULE, EXTENDED RELEASE ORAL
Qty: 30 CAPSULE | Refills: 2 | Status: SHIPPED | OUTPATIENT
Start: 2023-12-05

## 2024-02-29 DIAGNOSIS — F33.0 MILD EPISODE OF RECURRENT MAJOR DEPRESSIVE DISORDER: ICD-10-CM

## 2024-02-29 DIAGNOSIS — F42.9 OBSESSIVE-COMPULSIVE DISORDER, UNSPECIFIED TYPE: ICD-10-CM

## 2024-02-29 DIAGNOSIS — G47.00 INSOMNIA, UNSPECIFIED TYPE: ICD-10-CM

## 2024-03-01 RX ORDER — CLOMIPRAMINE HYDROCHLORIDE 25 MG/1
50 CAPSULE ORAL NIGHTLY
Qty: 60 CAPSULE | Refills: 2 | Status: SHIPPED | OUTPATIENT
Start: 2024-03-01

## 2024-03-05 DIAGNOSIS — F42.9 OBSESSIVE-COMPULSIVE DISORDER, UNSPECIFIED TYPE: ICD-10-CM

## 2024-03-05 DIAGNOSIS — F33.0 MILD EPISODE OF RECURRENT MAJOR DEPRESSIVE DISORDER: ICD-10-CM

## 2024-03-05 RX ORDER — VENLAFAXINE HYDROCHLORIDE 37.5 MG/1
CAPSULE, EXTENDED RELEASE ORAL
Qty: 30 CAPSULE | Refills: 2 | OUTPATIENT
Start: 2024-03-05

## 2024-04-05 DIAGNOSIS — F42.9 OBSESSIVE-COMPULSIVE DISORDER, UNSPECIFIED TYPE: ICD-10-CM

## 2024-04-05 DIAGNOSIS — F33.0 MILD EPISODE OF RECURRENT MAJOR DEPRESSIVE DISORDER: ICD-10-CM

## 2024-04-05 RX ORDER — VENLAFAXINE HYDROCHLORIDE 37.5 MG/1
CAPSULE, EXTENDED RELEASE ORAL
Qty: 30 CAPSULE | Refills: 2 | Status: SHIPPED | OUTPATIENT
Start: 2024-04-05

## 2024-05-09 DIAGNOSIS — F42.9 OBSESSIVE-COMPULSIVE DISORDER, UNSPECIFIED TYPE: ICD-10-CM

## 2024-05-09 DIAGNOSIS — F33.0 MILD EPISODE OF RECURRENT MAJOR DEPRESSIVE DISORDER: ICD-10-CM

## 2024-05-09 DIAGNOSIS — G47.00 INSOMNIA, UNSPECIFIED TYPE: ICD-10-CM

## 2024-05-09 RX ORDER — CLOMIPRAMINE HYDROCHLORIDE 25 MG/1
50 CAPSULE ORAL NIGHTLY
Qty: 60 CAPSULE | Refills: 2 | Status: SHIPPED | OUTPATIENT
Start: 2024-05-09

## 2024-07-05 DIAGNOSIS — F33.0 MILD EPISODE OF RECURRENT MAJOR DEPRESSIVE DISORDER: ICD-10-CM

## 2024-07-05 DIAGNOSIS — F42.9 OBSESSIVE-COMPULSIVE DISORDER, UNSPECIFIED TYPE: ICD-10-CM

## 2024-07-05 RX ORDER — VENLAFAXINE HYDROCHLORIDE 37.5 MG/1
CAPSULE, EXTENDED RELEASE ORAL
Qty: 30 CAPSULE | Refills: 2 | Status: SHIPPED | OUTPATIENT
Start: 2024-07-05

## 2024-09-12 DIAGNOSIS — F33.0 MILD EPISODE OF RECURRENT MAJOR DEPRESSIVE DISORDER: ICD-10-CM

## 2024-09-12 DIAGNOSIS — G47.00 INSOMNIA, UNSPECIFIED TYPE: ICD-10-CM

## 2024-09-12 DIAGNOSIS — F42.9 OBSESSIVE-COMPULSIVE DISORDER, UNSPECIFIED TYPE: ICD-10-CM

## 2024-09-12 RX ORDER — CLOMIPRAMINE HYDROCHLORIDE 25 MG/1
50 CAPSULE ORAL NIGHTLY
Qty: 60 CAPSULE | Refills: 2 | Status: SHIPPED | OUTPATIENT
Start: 2024-09-12

## 2024-10-05 DIAGNOSIS — F42.9 OBSESSIVE-COMPULSIVE DISORDER, UNSPECIFIED TYPE: ICD-10-CM

## 2024-10-05 DIAGNOSIS — F33.0 MILD EPISODE OF RECURRENT MAJOR DEPRESSIVE DISORDER: ICD-10-CM

## 2024-10-07 RX ORDER — VENLAFAXINE HYDROCHLORIDE 37.5 MG/1
37.5 CAPSULE, EXTENDED RELEASE ORAL DAILY
Qty: 30 CAPSULE | Refills: 2 | Status: SHIPPED | OUTPATIENT
Start: 2024-10-07

## 2025-01-07 DIAGNOSIS — F42.9 OBSESSIVE-COMPULSIVE DISORDER, UNSPECIFIED TYPE: ICD-10-CM

## 2025-01-07 DIAGNOSIS — F33.0 MILD EPISODE OF RECURRENT MAJOR DEPRESSIVE DISORDER: ICD-10-CM

## 2025-01-07 RX ORDER — VENLAFAXINE HYDROCHLORIDE 37.5 MG/1
37.5 CAPSULE, EXTENDED RELEASE ORAL DAILY
Qty: 30 CAPSULE | Refills: 2 | Status: SHIPPED | OUTPATIENT
Start: 2025-01-07

## 2025-01-07 NOTE — TELEPHONE ENCOUNTER
Patient has called requesting refills. I have reached out trying to schedule patient with an appointment. Was unable to reach her.Left voicemail message to call office and schedule an appointment as soon as possible.